# Patient Record
Sex: MALE | NOT HISPANIC OR LATINO | Employment: FULL TIME | URBAN - METROPOLITAN AREA
[De-identification: names, ages, dates, MRNs, and addresses within clinical notes are randomized per-mention and may not be internally consistent; named-entity substitution may affect disease eponyms.]

---

## 2017-10-13 ENCOUNTER — APPOINTMENT (OUTPATIENT)
Dept: CARDIOLOGY | Facility: CLINIC | Age: 52
DRG: 247 | End: 2017-10-13
Attending: STUDENT IN AN ORGANIZED HEALTH CARE EDUCATION/TRAINING PROGRAM
Payer: COMMERCIAL

## 2017-10-13 ENCOUNTER — HOSPITAL ENCOUNTER (INPATIENT)
Facility: CLINIC | Age: 52
LOS: 1 days | Discharge: HOME | DRG: 247 | End: 2017-10-14
Attending: EMERGENCY MEDICINE | Admitting: INTERNAL MEDICINE
Payer: COMMERCIAL

## 2017-10-13 ENCOUNTER — HOSPITAL ENCOUNTER (OUTPATIENT)
Age: 52
End: 2017-10-13
Attending: INTERNAL MEDICINE | Admitting: INTERNAL MEDICINE
Payer: COMMERCIAL

## 2017-10-13 ENCOUNTER — APPOINTMENT (OUTPATIENT)
Dept: CARDIOLOGY | Facility: CLINIC | Age: 52
DRG: 247 | End: 2017-10-13
Attending: INTERNAL MEDICINE
Payer: COMMERCIAL

## 2017-10-13 ENCOUNTER — APPOINTMENT (OUTPATIENT)
Dept: GENERAL RADIOLOGY | Facility: CLINIC | Age: 52
DRG: 247 | End: 2017-10-13
Attending: EMERGENCY MEDICINE
Payer: COMMERCIAL

## 2017-10-13 ENCOUNTER — APPOINTMENT (OUTPATIENT)
Dept: CT IMAGING | Facility: CLINIC | Age: 52
DRG: 247 | End: 2017-10-13
Attending: EMERGENCY MEDICINE
Payer: COMMERCIAL

## 2017-10-13 DIAGNOSIS — R06.89 DYSPNEA AND RESPIRATORY ABNORMALITY: ICD-10-CM

## 2017-10-13 DIAGNOSIS — I21.3 ST ELEVATION MYOCARDIAL INFARCTION (STEMI), UNSPECIFIED ARTERY (H): ICD-10-CM

## 2017-10-13 DIAGNOSIS — Z72.0 TOBACCO ABUSE: Primary | ICD-10-CM

## 2017-10-13 DIAGNOSIS — R06.00 DYSPNEA AND RESPIRATORY ABNORMALITY: ICD-10-CM

## 2017-10-13 DIAGNOSIS — G47.33 OSA (OBSTRUCTIVE SLEEP APNEA): ICD-10-CM

## 2017-10-13 PROBLEM — I24.9 ACS (ACUTE CORONARY SYNDROME) (H): Status: ACTIVE | Noted: 2017-10-13

## 2017-10-13 LAB
ANION GAP SERPL CALCULATED.3IONS-SCNC: 14 MMOL/L (ref 3–14)
ANION GAP SERPL CALCULATED.3IONS-SCNC: NORMAL MMOL/L (ref 6–17)
BASOPHILS # BLD AUTO: 0 10E9/L (ref 0–0.2)
BASOPHILS NFR BLD AUTO: 0.3 %
BUN SERPL-MCNC: 20 MG/DL (ref 7–30)
BUN SERPL-MCNC: NORMAL MG/DL (ref 7–30)
CALCIUM SERPL-MCNC: 8.9 MG/DL (ref 8.5–10.1)
CALCIUM SERPL-MCNC: NORMAL MG/DL (ref 8.5–10.1)
CHLORIDE SERPL-SCNC: 104 MMOL/L (ref 94–109)
CHLORIDE SERPL-SCNC: NORMAL MMOL/L (ref 94–109)
CO2 SERPL-SCNC: 21 MMOL/L (ref 20–32)
CO2 SERPL-SCNC: NORMAL MMOL/L (ref 20–32)
CREAT BLD-MCNC: 0.3 MG/DL (ref 0.66–1.25)
CREAT SERPL-MCNC: 0.6 MG/DL (ref 0.66–1.25)
CREAT SERPL-MCNC: NORMAL MG/DL (ref 0.66–1.25)
DIFFERENTIAL METHOD BLD: ABNORMAL
EOSINOPHIL # BLD AUTO: 0.2 10E9/L (ref 0–0.7)
EOSINOPHIL NFR BLD AUTO: 1.4 %
ERYTHROCYTE [DISTWIDTH] IN BLOOD BY AUTOMATED COUNT: 12.8 % (ref 10–15)
GFR SERPL CREATININE-BSD FRML MDRD: >90 ML/MIN/1.7M2
GFR SERPL CREATININE-BSD FRML MDRD: >90 ML/MIN/1.7M2
GFR SERPL CREATININE-BSD FRML MDRD: NORMAL ML/MIN/1.7M2
GLUCOSE BLDC GLUCOMTR-MCNC: 179 MG/DL (ref 70–99)
GLUCOSE BLDC GLUCOMTR-MCNC: 248 MG/DL (ref 70–99)
GLUCOSE BLDC GLUCOMTR-MCNC: 260 MG/DL (ref 70–99)
GLUCOSE SERPL-MCNC: 217 MG/DL (ref 70–99)
GLUCOSE SERPL-MCNC: NORMAL MG/DL (ref 70–99)
HCT VFR BLD AUTO: 45.4 % (ref 40–53)
HGB BLD-MCNC: 15.5 G/DL (ref 13.3–17.7)
IMM GRANULOCYTES # BLD: 0.1 10E9/L (ref 0–0.4)
IMM GRANULOCYTES NFR BLD: 0.3 %
INTERPRETATION ECG - MUSE: NORMAL
INTERPRETATION ECG - MUSE: NORMAL
KCT BLD-ACNC: 355 SEC (ref 105–167)
LYMPHOCYTES # BLD AUTO: 5.8 10E9/L (ref 0.8–5.3)
LYMPHOCYTES NFR BLD AUTO: 39.9 %
MCH RBC QN AUTO: 32.2 PG (ref 26.5–33)
MCHC RBC AUTO-ENTMCNC: 34.1 G/DL (ref 31.5–36.5)
MCV RBC AUTO: 94 FL (ref 78–100)
MONOCYTES # BLD AUTO: 1 10E9/L (ref 0–1.3)
MONOCYTES NFR BLD AUTO: 7.1 %
NEUTROPHILS # BLD AUTO: 7.4 10E9/L (ref 1.6–8.3)
NEUTROPHILS NFR BLD AUTO: 51 %
NRBC # BLD AUTO: 0 10*3/UL
NRBC BLD AUTO-RTO: 0 /100
PLATELET # BLD AUTO: 341 10E9/L (ref 150–450)
POTASSIUM SERPL-SCNC: 4.4 MMOL/L (ref 3.4–5.3)
POTASSIUM SERPL-SCNC: NORMAL MMOL/L (ref 3.4–5.3)
RBC # BLD AUTO: 4.82 10E12/L (ref 4.4–5.9)
SODIUM SERPL-SCNC: 139 MMOL/L (ref 133–144)
SODIUM SERPL-SCNC: NORMAL MMOL/L (ref 133–144)
TROPONIN I BLD-MCNC: 0.01 UG/L (ref 0–0.1)
TROPONIN I SERPL-MCNC: 0.04 UG/L (ref 0–0.04)
TROPONIN I SERPL-MCNC: 48.61 UG/L (ref 0–0.04)
TROPONIN I SERPL-MCNC: NORMAL UG/L (ref 0–0.04)
WBC # BLD AUTO: 14.5 10E9/L (ref 4–11)

## 2017-10-13 PROCEDURE — 25000128 H RX IP 250 OP 636: Performed by: INTERNAL MEDICINE

## 2017-10-13 PROCEDURE — 96374 THER/PROPH/DIAG INJ IV PUSH: CPT | Performed by: EMERGENCY MEDICINE

## 2017-10-13 PROCEDURE — 96376 TX/PRO/DX INJ SAME DRUG ADON: CPT | Performed by: EMERGENCY MEDICINE

## 2017-10-13 PROCEDURE — 25000132 ZZH RX MED GY IP 250 OP 250 PS 637: Performed by: INTERNAL MEDICINE

## 2017-10-13 PROCEDURE — 25000128 H RX IP 250 OP 636: Performed by: STUDENT IN AN ORGANIZED HEALTH CARE EDUCATION/TRAINING PROGRAM

## 2017-10-13 PROCEDURE — 93010 ELECTROCARDIOGRAM REPORT: CPT | Mod: 76 | Performed by: INTERNAL MEDICINE

## 2017-10-13 PROCEDURE — C1769 GUIDE WIRE: HCPCS

## 2017-10-13 PROCEDURE — 25000132 ZZH RX MED GY IP 250 OP 250 PS 637: Performed by: EMERGENCY MEDICINE

## 2017-10-13 PROCEDURE — 25000128 H RX IP 250 OP 636: Performed by: EMERGENCY MEDICINE

## 2017-10-13 PROCEDURE — 85025 COMPLETE CBC W/AUTO DIFF WBC: CPT | Performed by: EMERGENCY MEDICINE

## 2017-10-13 PROCEDURE — C9606 PERC D-E COR REVASC W AMI S: HCPCS

## 2017-10-13 PROCEDURE — 40000264 ECHO COMPLETE WITH OPTISON

## 2017-10-13 PROCEDURE — C1757 CATH, THROMBECTOMY/EMBOLECT: HCPCS

## 2017-10-13 PROCEDURE — 21400006 ZZH R&B CCU INTERMEDIATE UMMC

## 2017-10-13 PROCEDURE — 99291 CRITICAL CARE FIRST HOUR: CPT | Mod: 25 | Performed by: EMERGENCY MEDICINE

## 2017-10-13 PROCEDURE — 36415 COLL VENOUS BLD VENIPUNCTURE: CPT | Performed by: STUDENT IN AN ORGANIZED HEALTH CARE EDUCATION/TRAINING PROGRAM

## 2017-10-13 PROCEDURE — 74178 CT ABD&PLV WO CNTR FLWD CNTR: CPT

## 2017-10-13 PROCEDURE — C1894 INTRO/SHEATH, NON-LASER: HCPCS

## 2017-10-13 PROCEDURE — 00000146 ZZHCL STATISTIC GLUCOSE BY METER IP

## 2017-10-13 PROCEDURE — 93306 TTE W/DOPPLER COMPLETE: CPT | Mod: 26 | Performed by: INTERNAL MEDICINE

## 2017-10-13 PROCEDURE — 027035Z DILATION OF CORONARY ARTERY, ONE ARTERY WITH TWO DRUG-ELUTING INTRALUMINAL DEVICES, PERCUTANEOUS APPROACH: ICD-10-PCS | Performed by: INTERNAL MEDICINE

## 2017-10-13 PROCEDURE — 25000132 ZZH RX MED GY IP 250 OP 250 PS 637: Performed by: STUDENT IN AN ORGANIZED HEALTH CARE EDUCATION/TRAINING PROGRAM

## 2017-10-13 PROCEDURE — 71010 XR CHEST PORT 1 VW: CPT

## 2017-10-13 PROCEDURE — 99153 MOD SED SAME PHYS/QHP EA: CPT

## 2017-10-13 PROCEDURE — 84484 ASSAY OF TROPONIN QUANT: CPT | Performed by: EMERGENCY MEDICINE

## 2017-10-13 PROCEDURE — 93005 ELECTROCARDIOGRAM TRACING: CPT

## 2017-10-13 PROCEDURE — C1887 CATHETER, GUIDING: HCPCS

## 2017-10-13 PROCEDURE — 84484 ASSAY OF TROPONIN QUANT: CPT | Performed by: STUDENT IN AN ORGANIZED HEALTH CARE EDUCATION/TRAINING PROGRAM

## 2017-10-13 PROCEDURE — B2111ZZ FLUOROSCOPY OF MULTIPLE CORONARY ARTERIES USING LOW OSMOLAR CONTRAST: ICD-10-PCS | Performed by: INTERNAL MEDICINE

## 2017-10-13 PROCEDURE — 27210787 ZZH MANIFOLD CR2

## 2017-10-13 PROCEDURE — 92941 PRQ TRLML REVSC TOT OCCL AMI: CPT | Mod: LD | Performed by: INTERNAL MEDICINE

## 2017-10-13 PROCEDURE — 25000125 ZZHC RX 250: Performed by: EMERGENCY MEDICINE

## 2017-10-13 PROCEDURE — 80048 BASIC METABOLIC PNL TOTAL CA: CPT | Performed by: EMERGENCY MEDICINE

## 2017-10-13 PROCEDURE — 93454 CORONARY ARTERY ANGIO S&I: CPT | Mod: 26 | Performed by: INTERNAL MEDICINE

## 2017-10-13 PROCEDURE — 71270 CT THORAX DX C-/C+: CPT

## 2017-10-13 PROCEDURE — 85347 COAGULATION TIME ACTIVATED: CPT

## 2017-10-13 PROCEDURE — C1760 CLOSURE DEV, VASC: HCPCS

## 2017-10-13 PROCEDURE — C1874 STENT, COATED/COV W/DEL SYS: HCPCS

## 2017-10-13 PROCEDURE — 93454 CORONARY ARTERY ANGIO S&I: CPT

## 2017-10-13 PROCEDURE — 84484 ASSAY OF TROPONIN QUANT: CPT

## 2017-10-13 PROCEDURE — 27211089 ZZH KIT ACIST INJECTOR CR3

## 2017-10-13 PROCEDURE — 93010 ELECTROCARDIOGRAM REPORT: CPT | Mod: Z6 | Performed by: EMERGENCY MEDICINE

## 2017-10-13 PROCEDURE — 93005 ELECTROCARDIOGRAM TRACING: CPT | Performed by: EMERGENCY MEDICINE

## 2017-10-13 PROCEDURE — 02C03ZZ EXTIRPATION OF MATTER FROM CORONARY ARTERY, ONE ARTERY, PERCUTANEOUS APPROACH: ICD-10-PCS | Performed by: INTERNAL MEDICINE

## 2017-10-13 PROCEDURE — 25000131 ZZH RX MED GY IP 250 OP 636 PS 637: Performed by: STUDENT IN AN ORGANIZED HEALTH CARE EDUCATION/TRAINING PROGRAM

## 2017-10-13 PROCEDURE — 96375 TX/PRO/DX INJ NEW DRUG ADDON: CPT | Performed by: EMERGENCY MEDICINE

## 2017-10-13 PROCEDURE — C1725 CATH, TRANSLUMIN NON-LASER: HCPCS

## 2017-10-13 PROCEDURE — 99222 1ST HOSP IP/OBS MODERATE 55: CPT | Mod: 25 | Performed by: INTERNAL MEDICINE

## 2017-10-13 PROCEDURE — 27210946 ZZH KIT HC TOTES DISP CR8

## 2017-10-13 PROCEDURE — 25000125 ZZHC RX 250: Performed by: INTERNAL MEDICINE

## 2017-10-13 PROCEDURE — 99152 MOD SED SAME PHYS/QHP 5/>YRS: CPT

## 2017-10-13 PROCEDURE — 25500064 ZZH RX 255 OP 636: Performed by: INTERNAL MEDICINE

## 2017-10-13 PROCEDURE — 82565 ASSAY OF CREATININE: CPT

## 2017-10-13 RX ORDER — NICARDIPINE HYDROCHLORIDE 2.5 MG/ML
100 INJECTION INTRAVENOUS
Status: DISCONTINUED | OUTPATIENT
Start: 2017-10-13 | End: 2018-12-09 | Stop reason: HOSPADM

## 2017-10-13 RX ORDER — ASPIRIN 81 MG/1
81-324 TABLET, CHEWABLE ORAL
Status: DISCONTINUED | OUTPATIENT
Start: 2017-10-13 | End: 2017-10-13

## 2017-10-13 RX ORDER — METHYLPREDNISOLONE SODIUM SUCCINATE 125 MG/2ML
125 INJECTION, POWDER, LYOPHILIZED, FOR SOLUTION INTRAMUSCULAR; INTRAVENOUS
Status: DISCONTINUED | OUTPATIENT
Start: 2017-10-13 | End: 2017-10-13 | Stop reason: HOSPADM

## 2017-10-13 RX ORDER — LISINOPRIL 10 MG/1
10 TABLET ORAL DAILY
Status: DISCONTINUED | OUTPATIENT
Start: 2017-10-13 | End: 2017-10-13

## 2017-10-13 RX ORDER — CLOPIDOGREL BISULFATE 75 MG/1
300-600 TABLET ORAL
Status: DISCONTINUED | OUTPATIENT
Start: 2017-10-13 | End: 2017-10-13

## 2017-10-13 RX ORDER — MORPHINE SULFATE 4 MG/ML
4 INJECTION, SOLUTION INTRAMUSCULAR; INTRAVENOUS ONCE
Status: COMPLETED | OUTPATIENT
Start: 2017-10-13 | End: 2017-10-13

## 2017-10-13 RX ORDER — PROTAMINE SULFATE 10 MG/ML
1-5 INJECTION, SOLUTION INTRAVENOUS
Status: DISCONTINUED | OUTPATIENT
Start: 2017-10-13 | End: 2017-10-13 | Stop reason: HOSPADM

## 2017-10-13 RX ORDER — CARVEDILOL 6.25 MG/1
6.25 TABLET ORAL ONCE
Status: COMPLETED | OUTPATIENT
Start: 2017-10-13 | End: 2017-10-13

## 2017-10-13 RX ORDER — CLOPIDOGREL BISULFATE 75 MG/1
75 TABLET ORAL
Status: DISCONTINUED | OUTPATIENT
Start: 2017-10-13 | End: 2017-10-13 | Stop reason: HOSPADM

## 2017-10-13 RX ORDER — ENALAPRILAT 1.25 MG/ML
1.25-2.5 INJECTION INTRAVENOUS
Status: DISCONTINUED | OUTPATIENT
Start: 2017-10-13 | End: 2017-10-13 | Stop reason: HOSPADM

## 2017-10-13 RX ORDER — NALOXONE HYDROCHLORIDE 0.4 MG/ML
.2-.4 INJECTION, SOLUTION INTRAMUSCULAR; INTRAVENOUS; SUBCUTANEOUS
Status: ACTIVE | OUTPATIENT
Start: 2017-10-13 | End: 2017-10-13

## 2017-10-13 RX ORDER — ARGATROBAN 1 MG/ML
350 INJECTION, SOLUTION INTRAVENOUS
Status: DISCONTINUED | OUTPATIENT
Start: 2017-10-13 | End: 2017-10-13 | Stop reason: HOSPADM

## 2017-10-13 RX ORDER — EPTIFIBATIDE 2 MG/ML
2 INJECTION, SOLUTION INTRAVENOUS CONTINUOUS PRN
Status: DISCONTINUED | OUTPATIENT
Start: 2017-10-13 | End: 2017-10-13

## 2017-10-13 RX ORDER — PHENYLEPHRINE HCL IN 0.9% NACL 1 MG/10 ML
20-100 SYRINGE (ML) INTRAVENOUS
Status: DISCONTINUED | OUTPATIENT
Start: 2017-10-13 | End: 2017-10-13 | Stop reason: HOSPADM

## 2017-10-13 RX ORDER — VERAPAMIL HYDROCHLORIDE 2.5 MG/ML
1-5 INJECTION, SOLUTION INTRAVENOUS
Status: DISCONTINUED | OUTPATIENT
Start: 2017-10-13 | End: 2017-10-13 | Stop reason: HOSPADM

## 2017-10-13 RX ORDER — ONDANSETRON 4 MG/1
4 TABLET, ORALLY DISINTEGRATING ORAL EVERY 6 HOURS PRN
Status: DISCONTINUED | OUTPATIENT
Start: 2017-10-13 | End: 2017-10-14 | Stop reason: HOSPADM

## 2017-10-13 RX ORDER — PROTAMINE SULFATE 10 MG/ML
25-100 INJECTION, SOLUTION INTRAVENOUS EVERY 5 MIN PRN
Status: DISCONTINUED | OUTPATIENT
Start: 2017-10-13 | End: 2017-10-13

## 2017-10-13 RX ORDER — ATORVASTATIN CALCIUM 80 MG/1
80 TABLET, FILM COATED ORAL DAILY
Status: DISCONTINUED | OUTPATIENT
Start: 2017-10-13 | End: 2017-10-14 | Stop reason: HOSPADM

## 2017-10-13 RX ORDER — ATROPINE SULFATE 0.1 MG/ML
.5-1 INJECTION INTRAVENOUS
Status: DISCONTINUED | OUTPATIENT
Start: 2017-10-13 | End: 2017-10-13

## 2017-10-13 RX ORDER — IOPAMIDOL 755 MG/ML
185 INJECTION, SOLUTION INTRAVASCULAR ONCE
Status: COMPLETED | OUTPATIENT
Start: 2017-10-13 | End: 2017-10-13

## 2017-10-13 RX ORDER — ONDANSETRON 2 MG/ML
4 INJECTION INTRAMUSCULAR; INTRAVENOUS EVERY 4 HOURS PRN
Status: DISCONTINUED | OUTPATIENT
Start: 2017-10-13 | End: 2017-10-13

## 2017-10-13 RX ORDER — DEXTROSE MONOHYDRATE 25 G/50ML
25-50 INJECTION, SOLUTION INTRAVENOUS
Status: DISCONTINUED | OUTPATIENT
Start: 2017-10-13 | End: 2017-10-14 | Stop reason: HOSPADM

## 2017-10-13 RX ORDER — EPINEPHRINE 1 MG/ML
0.3 INJECTION, SOLUTION, CONCENTRATE INTRAVENOUS
Status: DISCONTINUED | OUTPATIENT
Start: 2017-10-13 | End: 2017-10-13 | Stop reason: HOSPADM

## 2017-10-13 RX ORDER — ATORVASTATIN CALCIUM 80 MG/1
80 TABLET, FILM COATED ORAL DAILY
Status: DISCONTINUED | OUTPATIENT
Start: 2017-10-13 | End: 2017-10-13

## 2017-10-13 RX ORDER — ENALAPRILAT 1.25 MG/ML
1.25-2.5 INJECTION INTRAVENOUS
Status: DISCONTINUED | OUTPATIENT
Start: 2017-10-13 | End: 2017-10-13

## 2017-10-13 RX ORDER — PROTAMINE SULFATE 10 MG/ML
25-100 INJECTION, SOLUTION INTRAVENOUS EVERY 5 MIN PRN
Status: DISCONTINUED | OUTPATIENT
Start: 2017-10-13 | End: 2017-10-13 | Stop reason: HOSPADM

## 2017-10-13 RX ORDER — FENTANYL CITRATE 50 UG/ML
25-50 INJECTION, SOLUTION INTRAMUSCULAR; INTRAVENOUS
Status: DISCONTINUED | OUTPATIENT
Start: 2017-10-13 | End: 2017-10-13 | Stop reason: HOSPADM

## 2017-10-13 RX ORDER — NITROGLYCERIN 0.4 MG/1
0.4 TABLET SUBLINGUAL EVERY 5 MIN PRN
Status: DISCONTINUED | OUTPATIENT
Start: 2017-10-13 | End: 2017-10-14 | Stop reason: HOSPADM

## 2017-10-13 RX ORDER — DEXTROSE MONOHYDRATE 25 G/50ML
12.5-5 INJECTION, SOLUTION INTRAVENOUS EVERY 30 MIN PRN
Status: DISCONTINUED | OUTPATIENT
Start: 2017-10-13 | End: 2017-10-13

## 2017-10-13 RX ORDER — OXYCODONE HYDROCHLORIDE 5 MG/1
5 TABLET ORAL EVERY 6 HOURS PRN
Status: DISCONTINUED | OUTPATIENT
Start: 2017-10-13 | End: 2017-10-13

## 2017-10-13 RX ORDER — FENTANYL CITRATE 50 UG/ML
25-50 INJECTION, SOLUTION INTRAMUSCULAR; INTRAVENOUS
Status: ACTIVE | OUTPATIENT
Start: 2017-10-13 | End: 2017-10-13

## 2017-10-13 RX ORDER — POTASSIUM CHLORIDE 29.8 MG/ML
20 INJECTION INTRAVENOUS
Status: DISCONTINUED | OUTPATIENT
Start: 2017-10-13 | End: 2017-10-13 | Stop reason: HOSPADM

## 2017-10-13 RX ORDER — PROMETHAZINE HYDROCHLORIDE 25 MG/ML
6.25-25 INJECTION, SOLUTION INTRAMUSCULAR; INTRAVENOUS EVERY 4 HOURS PRN
Status: DISCONTINUED | OUTPATIENT
Start: 2017-10-13 | End: 2017-10-13 | Stop reason: HOSPADM

## 2017-10-13 RX ORDER — DOBUTAMINE HYDROCHLORIDE 200 MG/100ML
2-20 INJECTION INTRAVENOUS CONTINUOUS PRN
Status: DISCONTINUED | OUTPATIENT
Start: 2017-10-13 | End: 2017-10-13

## 2017-10-13 RX ORDER — DIPHENHYDRAMINE HYDROCHLORIDE 50 MG/ML
25-50 INJECTION INTRAMUSCULAR; INTRAVENOUS
Status: DISCONTINUED | OUTPATIENT
Start: 2017-10-13 | End: 2017-10-13 | Stop reason: HOSPADM

## 2017-10-13 RX ORDER — CLOPIDOGREL BISULFATE 75 MG/1
75 TABLET ORAL
Status: DISCONTINUED | OUTPATIENT
Start: 2017-10-13 | End: 2017-10-13

## 2017-10-13 RX ORDER — LISINOPRIL 5 MG/1
5 TABLET ORAL ONCE
Status: COMPLETED | OUTPATIENT
Start: 2017-10-13 | End: 2017-10-13

## 2017-10-13 RX ORDER — ASPIRIN 325 MG
325 TABLET ORAL
Status: DISCONTINUED | OUTPATIENT
Start: 2017-10-13 | End: 2017-10-13

## 2017-10-13 RX ORDER — NICARDIPINE HYDROCHLORIDE 2.5 MG/ML
100 INJECTION INTRAVENOUS
Status: DISCONTINUED | OUTPATIENT
Start: 2017-10-13 | End: 2017-10-13

## 2017-10-13 RX ORDER — MORPHINE SULFATE 4 MG/ML
4 INJECTION, SOLUTION INTRAMUSCULAR; INTRAVENOUS ONCE
Status: DISCONTINUED | OUTPATIENT
Start: 2017-10-13 | End: 2017-10-14 | Stop reason: HOSPADM

## 2017-10-13 RX ORDER — ARGATROBAN 1 MG/ML
150 INJECTION, SOLUTION INTRAVENOUS
Status: DISCONTINUED | OUTPATIENT
Start: 2017-10-13 | End: 2017-10-13

## 2017-10-13 RX ORDER — LORAZEPAM 1 MG/1
2 TABLET ORAL
Status: DISCONTINUED | OUTPATIENT
Start: 2017-10-13 | End: 2017-10-13

## 2017-10-13 RX ORDER — MAGNESIUM HYDROXIDE 1200 MG/15ML
1000 LIQUID ORAL CONTINUOUS PRN
Status: DISCONTINUED | OUTPATIENT
Start: 2017-10-13 | End: 2017-10-13

## 2017-10-13 RX ORDER — METOPROLOL TARTRATE 25 MG/1
25 TABLET, FILM COATED ORAL 2 TIMES DAILY
Status: DISCONTINUED | OUTPATIENT
Start: 2017-10-13 | End: 2017-10-13

## 2017-10-13 RX ORDER — ATROPINE SULFATE 0.1 MG/ML
.5-1 INJECTION INTRAVENOUS
Status: DISCONTINUED | OUTPATIENT
Start: 2017-10-13 | End: 2017-10-13 | Stop reason: HOSPADM

## 2017-10-13 RX ORDER — LIDOCAINE HYDROCHLORIDE 10 MG/ML
30 INJECTION, SOLUTION EPIDURAL; INFILTRATION; INTRACAUDAL; PERINEURAL
Status: DISCONTINUED | OUTPATIENT
Start: 2017-10-13 | End: 2018-12-09 | Stop reason: HOSPADM

## 2017-10-13 RX ORDER — LIDOCAINE HYDROCHLORIDE 10 MG/ML
30 INJECTION, SOLUTION EPIDURAL; INFILTRATION; INTRACAUDAL; PERINEURAL
Status: DISCONTINUED | OUTPATIENT
Start: 2017-10-13 | End: 2017-10-13 | Stop reason: HOSPADM

## 2017-10-13 RX ORDER — NIFEDIPINE 10 MG/1
10 CAPSULE ORAL
Status: DISCONTINUED | OUTPATIENT
Start: 2017-10-13 | End: 2018-12-09 | Stop reason: HOSPADM

## 2017-10-13 RX ORDER — FLUMAZENIL 0.1 MG/ML
0.2 INJECTION, SOLUTION INTRAVENOUS
Status: ACTIVE | OUTPATIENT
Start: 2017-10-13 | End: 2017-10-13

## 2017-10-13 RX ORDER — NITROGLYCERIN 5 MG/ML
100-200 VIAL (ML) INTRAVENOUS
Status: DISCONTINUED | OUTPATIENT
Start: 2017-10-13 | End: 2018-12-09 | Stop reason: HOSPADM

## 2017-10-13 RX ORDER — FUROSEMIDE 10 MG/ML
20-100 INJECTION INTRAMUSCULAR; INTRAVENOUS
Status: DISCONTINUED | OUTPATIENT
Start: 2017-10-13 | End: 2017-10-13 | Stop reason: HOSPADM

## 2017-10-13 RX ORDER — NITROGLYCERIN 5 MG/ML
100-500 VIAL (ML) INTRAVENOUS
Status: DISCONTINUED | OUTPATIENT
Start: 2017-10-13 | End: 2018-12-09 | Stop reason: HOSPADM

## 2017-10-13 RX ORDER — ASPIRIN 325 MG
325 TABLET ORAL
Status: DISCONTINUED | OUTPATIENT
Start: 2017-10-13 | End: 2017-10-13 | Stop reason: HOSPADM

## 2017-10-13 RX ORDER — ARGATROBAN 1 MG/ML
150 INJECTION, SOLUTION INTRAVENOUS
Status: DISCONTINUED | OUTPATIENT
Start: 2017-10-13 | End: 2017-10-13 | Stop reason: HOSPADM

## 2017-10-13 RX ORDER — NITROGLYCERIN 0.4 MG/1
0.4 TABLET SUBLINGUAL EVERY 5 MIN PRN
Status: DISCONTINUED | OUTPATIENT
Start: 2017-10-13 | End: 2017-10-13 | Stop reason: HOSPADM

## 2017-10-13 RX ORDER — PRASUGREL 10 MG/1
10-60 TABLET, FILM COATED ORAL
Status: DISCONTINUED | OUTPATIENT
Start: 2017-10-13 | End: 2017-10-13

## 2017-10-13 RX ORDER — ACETAMINOPHEN 325 MG/1
650 TABLET ORAL EVERY 4 HOURS PRN
Status: DISCONTINUED | OUTPATIENT
Start: 2017-10-13 | End: 2017-10-14 | Stop reason: HOSPADM

## 2017-10-13 RX ORDER — LORAZEPAM 2 MG/ML
0.5 INJECTION INTRAMUSCULAR ONCE
Status: COMPLETED | OUTPATIENT
Start: 2017-10-13 | End: 2017-10-13

## 2017-10-13 RX ORDER — DOPAMINE HYDROCHLORIDE 160 MG/100ML
2-20 INJECTION, SOLUTION INTRAVENOUS CONTINUOUS PRN
Status: DISCONTINUED | OUTPATIENT
Start: 2017-10-13 | End: 2017-10-13 | Stop reason: HOSPADM

## 2017-10-13 RX ORDER — LIDOCAINE 40 MG/G
CREAM TOPICAL
Status: DISCONTINUED | OUTPATIENT
Start: 2017-10-13 | End: 2017-10-14 | Stop reason: HOSPADM

## 2017-10-13 RX ORDER — ADENOSINE 3 MG/ML
12-12000 INJECTION, SOLUTION INTRAVENOUS
Status: DISCONTINUED | OUTPATIENT
Start: 2017-10-13 | End: 2017-10-13 | Stop reason: HOSPADM

## 2017-10-13 RX ORDER — POTASSIUM CHLORIDE 7.45 MG/ML
10 INJECTION INTRAVENOUS
Status: DISCONTINUED | OUTPATIENT
Start: 2017-10-13 | End: 2017-10-13

## 2017-10-13 RX ORDER — HYDRALAZINE HYDROCHLORIDE 20 MG/ML
10-20 INJECTION INTRAMUSCULAR; INTRAVENOUS
Status: DISCONTINUED | OUTPATIENT
Start: 2017-10-13 | End: 2017-10-13 | Stop reason: HOSPADM

## 2017-10-13 RX ORDER — NITROGLYCERIN 20 MG/100ML
.07-2 INJECTION INTRAVENOUS CONTINUOUS PRN
Status: DISCONTINUED | OUTPATIENT
Start: 2017-10-13 | End: 2017-10-13

## 2017-10-13 RX ORDER — ASPIRIN 81 MG/1
81 TABLET ORAL DAILY
Status: DISCONTINUED | OUTPATIENT
Start: 2017-10-14 | End: 2017-10-14 | Stop reason: HOSPADM

## 2017-10-13 RX ORDER — ARGATROBAN 1 MG/ML
350 INJECTION, SOLUTION INTRAVENOUS
Status: DISCONTINUED | OUTPATIENT
Start: 2017-10-13 | End: 2017-10-13

## 2017-10-13 RX ORDER — DOBUTAMINE HYDROCHLORIDE 200 MG/100ML
2-20 INJECTION INTRAVENOUS CONTINUOUS PRN
Status: DISCONTINUED | OUTPATIENT
Start: 2017-10-13 | End: 2017-10-13 | Stop reason: HOSPADM

## 2017-10-13 RX ORDER — NIFEDIPINE 10 MG/1
10 CAPSULE ORAL
Status: DISCONTINUED | OUTPATIENT
Start: 2017-10-13 | End: 2017-10-13 | Stop reason: HOSPADM

## 2017-10-13 RX ORDER — IOPAMIDOL 755 MG/ML
100 INJECTION, SOLUTION INTRAVASCULAR ONCE
Status: COMPLETED | OUTPATIENT
Start: 2017-10-13 | End: 2017-10-13

## 2017-10-13 RX ORDER — FLUMAZENIL 0.1 MG/ML
0.2 INJECTION, SOLUTION INTRAVENOUS
Status: DISCONTINUED | OUTPATIENT
Start: 2017-10-13 | End: 2017-10-13 | Stop reason: HOSPADM

## 2017-10-13 RX ORDER — SODIUM NITROPRUSSIDE 25 MG/ML
100-200 INJECTION INTRAVENOUS
Status: DISCONTINUED | OUTPATIENT
Start: 2017-10-13 | End: 2017-10-13

## 2017-10-13 RX ORDER — ACETAMINOPHEN 650 MG/1
650 SUPPOSITORY RECTAL EVERY 4 HOURS PRN
Status: DISCONTINUED | OUTPATIENT
Start: 2017-10-13 | End: 2017-10-14 | Stop reason: HOSPADM

## 2017-10-13 RX ORDER — NITROGLYCERIN 20 MG/100ML
.07-2 INJECTION INTRAVENOUS CONTINUOUS PRN
Status: DISCONTINUED | OUTPATIENT
Start: 2017-10-13 | End: 2017-10-13 | Stop reason: HOSPADM

## 2017-10-13 RX ORDER — DOPAMINE HYDROCHLORIDE 160 MG/100ML
2-20 INJECTION, SOLUTION INTRAVENOUS CONTINUOUS PRN
Status: DISCONTINUED | OUTPATIENT
Start: 2017-10-13 | End: 2017-10-13

## 2017-10-13 RX ORDER — ONDANSETRON 2 MG/ML
4 INJECTION INTRAMUSCULAR; INTRAVENOUS EVERY 6 HOURS PRN
Status: DISCONTINUED | OUTPATIENT
Start: 2017-10-13 | End: 2017-10-14 | Stop reason: HOSPADM

## 2017-10-13 RX ORDER — EPINEPHRINE 1 MG/ML
0.3 INJECTION, SOLUTION, CONCENTRATE INTRAVENOUS
Status: DISCONTINUED | OUTPATIENT
Start: 2017-10-13 | End: 2017-10-13

## 2017-10-13 RX ORDER — METOPROLOL TARTRATE 1 MG/ML
5 INJECTION, SOLUTION INTRAVENOUS EVERY 5 MIN PRN
Status: DISCONTINUED | OUTPATIENT
Start: 2017-10-13 | End: 2017-10-13 | Stop reason: HOSPADM

## 2017-10-13 RX ORDER — ATROPINE SULFATE 0.1 MG/ML
0.5 INJECTION INTRAVENOUS EVERY 5 MIN PRN
Status: ACTIVE | OUTPATIENT
Start: 2017-10-13 | End: 2017-10-13

## 2017-10-13 RX ORDER — METOPROLOL TARTRATE 1 MG/ML
5 INJECTION, SOLUTION INTRAVENOUS EVERY 5 MIN PRN
Status: DISCONTINUED | OUTPATIENT
Start: 2017-10-13 | End: 2018-12-09 | Stop reason: HOSPADM

## 2017-10-13 RX ORDER — DEXTROSE MONOHYDRATE 25 G/50ML
12.5-5 INJECTION, SOLUTION INTRAVENOUS EVERY 30 MIN PRN
Status: DISCONTINUED | OUTPATIENT
Start: 2017-10-13 | End: 2017-10-13 | Stop reason: HOSPADM

## 2017-10-13 RX ORDER — HEPARIN SODIUM 1000 [USP'U]/ML
1000-10000 INJECTION, SOLUTION INTRAVENOUS; SUBCUTANEOUS EVERY 5 MIN PRN
Status: DISCONTINUED | OUTPATIENT
Start: 2017-10-13 | End: 2018-12-09 | Stop reason: HOSPADM

## 2017-10-13 RX ORDER — LORAZEPAM 2 MG/ML
.5-2 INJECTION INTRAMUSCULAR EVERY 4 HOURS PRN
Status: DISCONTINUED | OUTPATIENT
Start: 2017-10-13 | End: 2017-10-13 | Stop reason: HOSPADM

## 2017-10-13 RX ORDER — NALOXONE HYDROCHLORIDE 0.4 MG/ML
0.4 INJECTION, SOLUTION INTRAMUSCULAR; INTRAVENOUS; SUBCUTANEOUS EVERY 5 MIN PRN
Status: DISCONTINUED | OUTPATIENT
Start: 2017-10-13 | End: 2017-10-13 | Stop reason: HOSPADM

## 2017-10-13 RX ORDER — NALOXONE HYDROCHLORIDE 0.4 MG/ML
.1-.4 INJECTION, SOLUTION INTRAMUSCULAR; INTRAVENOUS; SUBCUTANEOUS
Status: DISCONTINUED | OUTPATIENT
Start: 2017-10-13 | End: 2017-10-14 | Stop reason: HOSPADM

## 2017-10-13 RX ORDER — ASPIRIN 81 MG/1
81 TABLET ORAL DAILY
Status: DISCONTINUED | OUTPATIENT
Start: 2017-10-13 | End: 2017-10-13

## 2017-10-13 RX ORDER — ONDANSETRON 2 MG/ML
4 INJECTION INTRAMUSCULAR; INTRAVENOUS EVERY 4 HOURS PRN
Status: DISCONTINUED | OUTPATIENT
Start: 2017-10-13 | End: 2017-10-13 | Stop reason: HOSPADM

## 2017-10-13 RX ORDER — EPTIFIBATIDE 2 MG/ML
180 INJECTION, SOLUTION INTRAVENOUS EVERY 10 MIN PRN
Status: DISCONTINUED | OUTPATIENT
Start: 2017-10-13 | End: 2017-10-13 | Stop reason: HOSPADM

## 2017-10-13 RX ORDER — NICARDIPINE HYDROCHLORIDE 2.5 MG/ML
100 INJECTION INTRAVENOUS
Status: DISCONTINUED | OUTPATIENT
Start: 2017-10-13 | End: 2017-10-13 | Stop reason: HOSPADM

## 2017-10-13 RX ORDER — EPTIFIBATIDE 2 MG/ML
1 INJECTION, SOLUTION INTRAVENOUS CONTINUOUS PRN
Status: DISCONTINUED | OUTPATIENT
Start: 2017-10-13 | End: 2017-10-13 | Stop reason: HOSPADM

## 2017-10-13 RX ORDER — SIMVASTATIN 40 MG
40 TABLET ORAL DAILY
Status: DISCONTINUED | OUTPATIENT
Start: 2017-10-13 | End: 2017-10-13

## 2017-10-13 RX ORDER — PROTAMINE SULFATE 10 MG/ML
1-5 INJECTION, SOLUTION INTRAVENOUS
Status: DISCONTINUED | OUTPATIENT
Start: 2017-10-13 | End: 2017-10-13

## 2017-10-13 RX ORDER — NALOXONE HYDROCHLORIDE 0.4 MG/ML
.1-.4 INJECTION, SOLUTION INTRAMUSCULAR; INTRAVENOUS; SUBCUTANEOUS
Status: DISCONTINUED | OUTPATIENT
Start: 2017-10-13 | End: 2017-10-13

## 2017-10-13 RX ORDER — NITROGLYCERIN 0.4 MG/1
0.4 TABLET SUBLINGUAL EVERY 5 MIN PRN
Status: DISCONTINUED | OUTPATIENT
Start: 2017-10-13 | End: 2018-12-09 | Stop reason: HOSPADM

## 2017-10-13 RX ORDER — LORAZEPAM 2 MG/ML
.5-2 INJECTION INTRAMUSCULAR EVERY 4 HOURS PRN
Status: DISCONTINUED | OUTPATIENT
Start: 2017-10-13 | End: 2017-10-13

## 2017-10-13 RX ORDER — SODIUM CHLORIDE 9 MG/ML
INJECTION, SOLUTION INTRAVENOUS CONTINUOUS
Status: ACTIVE | OUTPATIENT
Start: 2017-10-13 | End: 2017-10-13

## 2017-10-13 RX ORDER — FLUMAZENIL 0.1 MG/ML
0.2 INJECTION, SOLUTION INTRAVENOUS
Status: DISCONTINUED | OUTPATIENT
Start: 2017-10-13 | End: 2018-12-09 | Stop reason: HOSPADM

## 2017-10-13 RX ORDER — PHENYLEPHRINE HCL IN 0.9% NACL 1 MG/10 ML
20-100 SYRINGE (ML) INTRAVENOUS
Status: DISCONTINUED | OUTPATIENT
Start: 2017-10-13 | End: 2017-10-13

## 2017-10-13 RX ORDER — NITROGLYCERIN 5 MG/ML
100-200 VIAL (ML) INTRAVENOUS
Status: DISCONTINUED | OUTPATIENT
Start: 2017-10-13 | End: 2017-10-13 | Stop reason: HOSPADM

## 2017-10-13 RX ORDER — ASPIRIN 81 MG/1
81-324 TABLET, CHEWABLE ORAL
Status: DISCONTINUED | OUTPATIENT
Start: 2017-10-13 | End: 2017-10-13 | Stop reason: HOSPADM

## 2017-10-13 RX ORDER — HEPARIN SODIUM 1000 [USP'U]/ML
1000-10000 INJECTION, SOLUTION INTRAVENOUS; SUBCUTANEOUS EVERY 5 MIN PRN
Status: DISCONTINUED | OUTPATIENT
Start: 2017-10-13 | End: 2017-10-13 | Stop reason: HOSPADM

## 2017-10-13 RX ORDER — PRASUGREL 10 MG/1
10-60 TABLET, FILM COATED ORAL
Status: DISCONTINUED | OUTPATIENT
Start: 2017-10-13 | End: 2017-10-13 | Stop reason: HOSPADM

## 2017-10-13 RX ORDER — DIPHENHYDRAMINE HYDROCHLORIDE 50 MG/ML
25-50 INJECTION INTRAMUSCULAR; INTRAVENOUS
Status: DISCONTINUED | OUTPATIENT
Start: 2017-10-13 | End: 2017-10-13

## 2017-10-13 RX ORDER — ASPIRIN 81 MG/1
324 TABLET, CHEWABLE ORAL ONCE
Status: COMPLETED | OUTPATIENT
Start: 2017-10-13 | End: 2017-10-13

## 2017-10-13 RX ORDER — NALOXONE HYDROCHLORIDE 0.4 MG/ML
0.4 INJECTION, SOLUTION INTRAMUSCULAR; INTRAVENOUS; SUBCUTANEOUS EVERY 5 MIN PRN
Status: DISCONTINUED | OUTPATIENT
Start: 2017-10-13 | End: 2018-12-09 | Stop reason: HOSPADM

## 2017-10-13 RX ORDER — EPTIFIBATIDE 2 MG/ML
180 INJECTION, SOLUTION INTRAVENOUS EVERY 10 MIN PRN
Status: DISCONTINUED | OUTPATIENT
Start: 2017-10-13 | End: 2017-10-13

## 2017-10-13 RX ORDER — OXYCODONE HYDROCHLORIDE 10 MG/1
10 TABLET ORAL EVERY 6 HOURS PRN
Status: DISCONTINUED | OUTPATIENT
Start: 2017-10-13 | End: 2017-10-14 | Stop reason: HOSPADM

## 2017-10-13 RX ORDER — VERAPAMIL HYDROCHLORIDE 2.5 MG/ML
1-5 INJECTION, SOLUTION INTRAVENOUS
Status: DISCONTINUED | OUTPATIENT
Start: 2017-10-13 | End: 2017-10-13

## 2017-10-13 RX ORDER — CLOPIDOGREL BISULFATE 75 MG/1
300-600 TABLET ORAL
Status: DISCONTINUED | OUTPATIENT
Start: 2017-10-13 | End: 2017-10-13 | Stop reason: HOSPADM

## 2017-10-13 RX ORDER — SODIUM NITROPRUSSIDE 25 MG/ML
100-200 INJECTION INTRAVENOUS
Status: DISCONTINUED | OUTPATIENT
Start: 2017-10-13 | End: 2017-10-13 | Stop reason: HOSPADM

## 2017-10-13 RX ORDER — OXYCODONE HYDROCHLORIDE 5 MG/1
5 TABLET ORAL ONCE
Status: DISCONTINUED | OUTPATIENT
Start: 2017-10-13 | End: 2017-10-14 | Stop reason: HOSPADM

## 2017-10-13 RX ORDER — MAGNESIUM HYDROXIDE 1200 MG/15ML
1000 LIQUID ORAL CONTINUOUS PRN
Status: DISCONTINUED | OUTPATIENT
Start: 2017-10-13 | End: 2017-10-13 | Stop reason: HOSPADM

## 2017-10-13 RX ORDER — FUROSEMIDE 10 MG/ML
20-100 INJECTION INTRAMUSCULAR; INTRAVENOUS
Status: DISCONTINUED | OUTPATIENT
Start: 2017-10-13 | End: 2018-12-09 | Stop reason: HOSPADM

## 2017-10-13 RX ORDER — FENTANYL CITRATE 50 UG/ML
25-50 INJECTION, SOLUTION INTRAMUSCULAR; INTRAVENOUS
Status: DISCONTINUED | OUTPATIENT
Start: 2017-10-13 | End: 2017-10-13

## 2017-10-13 RX ORDER — NITROGLYCERIN 5 MG/ML
100-500 VIAL (ML) INTRAVENOUS
Status: DISCONTINUED | OUTPATIENT
Start: 2017-10-13 | End: 2017-10-13 | Stop reason: HOSPADM

## 2017-10-13 RX ORDER — METHYLPREDNISOLONE SODIUM SUCCINATE 125 MG/2ML
125 INJECTION, POWDER, LYOPHILIZED, FOR SOLUTION INTRAMUSCULAR; INTRAVENOUS
Status: DISCONTINUED | OUTPATIENT
Start: 2017-10-13 | End: 2017-10-13

## 2017-10-13 RX ORDER — CARVEDILOL 6.25 MG/1
6.25 TABLET ORAL 2 TIMES DAILY
Status: DISCONTINUED | OUTPATIENT
Start: 2017-10-13 | End: 2017-10-13

## 2017-10-13 RX ORDER — NICOTINE 21 MG/24HR
1 PATCH, TRANSDERMAL 24 HOURS TRANSDERMAL DAILY
Status: DISCONTINUED | OUTPATIENT
Start: 2017-10-13 | End: 2017-10-14 | Stop reason: HOSPADM

## 2017-10-13 RX ORDER — HYDRALAZINE HYDROCHLORIDE 20 MG/ML
10-20 INJECTION INTRAMUSCULAR; INTRAVENOUS
Status: DISCONTINUED | OUTPATIENT
Start: 2017-10-13 | End: 2018-12-09 | Stop reason: HOSPADM

## 2017-10-13 RX ORDER — POTASSIUM CHLORIDE 7.45 MG/ML
10 INJECTION INTRAVENOUS
Status: DISCONTINUED | OUTPATIENT
Start: 2017-10-13 | End: 2017-10-13 | Stop reason: HOSPADM

## 2017-10-13 RX ORDER — CARVEDILOL 12.5 MG/1
12.5 TABLET ORAL 2 TIMES DAILY
Status: DISCONTINUED | OUTPATIENT
Start: 2017-10-13 | End: 2017-10-14 | Stop reason: HOSPADM

## 2017-10-13 RX ORDER — POTASSIUM CHLORIDE 29.8 MG/ML
20 INJECTION INTRAVENOUS
Status: DISCONTINUED | OUTPATIENT
Start: 2017-10-13 | End: 2017-10-13

## 2017-10-13 RX ORDER — ADENOSINE 3 MG/ML
12-12000 INJECTION, SOLUTION INTRAVENOUS
Status: DISCONTINUED | OUTPATIENT
Start: 2017-10-13 | End: 2017-10-13

## 2017-10-13 RX ORDER — PROMETHAZINE HYDROCHLORIDE 25 MG/ML
6.25-25 INJECTION, SOLUTION INTRAMUSCULAR; INTRAVENOUS EVERY 4 HOURS PRN
Status: DISCONTINUED | OUTPATIENT
Start: 2017-10-13 | End: 2017-10-13

## 2017-10-13 RX ORDER — NICOTINE POLACRILEX 4 MG
15-30 LOZENGE BUCCAL
Status: DISCONTINUED | OUTPATIENT
Start: 2017-10-13 | End: 2017-10-14 | Stop reason: HOSPADM

## 2017-10-13 RX ADMIN — CARVEDILOL 6.25 MG: 6.25 TABLET, FILM COATED ORAL at 11:43

## 2017-10-13 RX ADMIN — LISINOPRIL 10 MG: 10 TABLET ORAL at 08:20

## 2017-10-13 RX ADMIN — ASPIRIN 81 MG CHEWABLE TABLET 324 MG: 81 TABLET CHEWABLE at 04:50

## 2017-10-13 RX ADMIN — TICAGRELOR 90 MG: 90 TABLET ORAL at 19:31

## 2017-10-13 RX ADMIN — ATORVASTATIN CALCIUM 80 MG: 80 TABLET, FILM COATED ORAL at 08:18

## 2017-10-13 RX ADMIN — IOPAMIDOL 185 ML: 755 INJECTION, SOLUTION INTRAVASCULAR at 05:45

## 2017-10-13 RX ADMIN — HEPARIN SODIUM 15000 UNITS: 1000 INJECTION, SOLUTION INTRAVENOUS; SUBCUTANEOUS at 05:19

## 2017-10-13 RX ADMIN — CARVEDILOL 12.5 MG: 12.5 TABLET, FILM COATED ORAL at 19:31

## 2017-10-13 RX ADMIN — HUMAN ALBUMIN MICROSPHERES AND PERFLUTREN 6 ML: 10; .22 INJECTION, SOLUTION INTRAVENOUS at 08:45

## 2017-10-13 RX ADMIN — MORPHINE SULFATE 4 MG: 4 INJECTION, SOLUTION INTRAMUSCULAR; INTRAVENOUS at 04:12

## 2017-10-13 RX ADMIN — Medication 1500 UNITS: at 05:19

## 2017-10-13 RX ADMIN — NITROGLYCERIN 0.4 MG: 0.4 TABLET SUBLINGUAL at 04:15

## 2017-10-13 RX ADMIN — FENTANYL CITRATE 50 MCG: 50 INJECTION, SOLUTION INTRAMUSCULAR; INTRAVENOUS at 05:11

## 2017-10-13 RX ADMIN — MORPHINE SULFATE 4 MG: 4 INJECTION, SOLUTION INTRAMUSCULAR; INTRAVENOUS at 04:47

## 2017-10-13 RX ADMIN — IOPAMIDOL 100 ML: 755 INJECTION, SOLUTION INTRAVENOUS at 04:48

## 2017-10-13 RX ADMIN — LISINOPRIL 5 MG: 5 TABLET ORAL at 11:43

## 2017-10-13 RX ADMIN — MIDAZOLAM HYDROCHLORIDE 2 MG: 1 INJECTION, SOLUTION INTRAMUSCULAR; INTRAVENOUS at 05:11

## 2017-10-13 RX ADMIN — TICAGRELOR 180 MG: 90 TABLET ORAL at 05:34

## 2017-10-13 RX ADMIN — OXYCODONE HYDROCHLORIDE 5 MG: 5 TABLET ORAL at 13:01

## 2017-10-13 RX ADMIN — INSULIN ASPART 1 UNITS: 100 INJECTION, SOLUTION INTRAVENOUS; SUBCUTANEOUS at 13:10

## 2017-10-13 RX ADMIN — NICOTINE 1 PATCH: 14 PATCH, EXTENDED RELEASE TRANSDERMAL at 08:20

## 2017-10-13 RX ADMIN — LORAZEPAM 0.5 MG: 2 INJECTION INTRAMUSCULAR; INTRAVENOUS at 05:00

## 2017-10-13 RX ADMIN — CARVEDILOL 6.25 MG: 6.25 TABLET, FILM COATED ORAL at 08:20

## 2017-10-13 RX ADMIN — INSULIN ASPART 3 UNITS: 100 INJECTION, SOLUTION INTRAVENOUS; SUBCUTANEOUS at 18:53

## 2017-10-13 RX ADMIN — SODIUM CHLORIDE, PRESERVATIVE FREE 91 ML: 5 INJECTION INTRAVENOUS at 04:49

## 2017-10-13 RX ADMIN — Medication 500 UNITS: at 05:19

## 2017-10-13 RX ADMIN — OXYCODONE HYDROCHLORIDE 5 MG: 5 TABLET ORAL at 15:11

## 2017-10-13 RX ADMIN — SODIUM CHLORIDE: 9 INJECTION, SOLUTION INTRAVENOUS at 06:27

## 2017-10-13 RX ADMIN — ENOXAPARIN SODIUM 40 MG: 40 INJECTION SUBCUTANEOUS at 14:36

## 2017-10-13 ASSESSMENT — ENCOUNTER SYMPTOMS
NAUSEA: 1
SHORTNESS OF BREATH: 1
COUGH: 1
DIAPHORESIS: 1
ABDOMINAL PAIN: 0
FEVER: 0
VOMITING: 0

## 2017-10-13 ASSESSMENT — PAIN DESCRIPTION - DESCRIPTORS
DESCRIPTORS: DISCOMFORT;DULL
DESCRIPTORS: DISCOMFORT;DULL

## 2017-10-13 NOTE — ED NOTES
Patient reports a burning sensation between his shoulder blades and down his back.     He took three 5 mg oxycodones before EMS got there and was crying for pain medicine in the ambulance. The paramedics told him he would not get any narcotic pain medicine and his demeanor changed and he was calm in the ambulance.     He said he lifted for heavy suitcases today and had a course crackly cough for a week

## 2017-10-13 NOTE — PROGRESS NOTES
CLINICAL NUTRITION SERVICES    Reason for Assessment:  Heart-healthy nutrition education, received consult.    Diet History:  Pt was sleeping during time for education, instructed pt's wife on heart healthy education.   Pt's wife reports no history of receiving heart-healthy nutrition education in the past.     Nutrition Diagnosis:  Food- and nutrition-related knowledge deficit r/t no previous knowledge of heart-healthy diet AEB pt report of no previous formal heart-healthy nutrition education.    Nutrition Prescription/Recs:  Continue heart-healthy diet.      Interventions:  Nutrition Education  1.  Provided verbal instruction on a heart-healthy diet.  2.  Provided handouts:  Heart Health Guidelines (includes Heart Healthy Food Choices), Your Heart-Healthy Diet (Words You Will Hear), 10 Tips for Heart-Healthy Cooking, Dining Out With Your Heart In Mind, Recipe Changes for Heart-Healthy Cooking,  How to Read Nutrition Labels, Low-Sodium Foods and Drinks, Tips for a Low-Sodium Diet, and Seasoning Your Foods Without Adding Salt.      Goals:    1.  Pt will verbalize at least four foods high in saturated or trans-fats and five foods high in sodium.    2.  Pt will list at least two interventions to make current meal plan more heart-healthy.     Follow-up:   Patient to ask any further nutrition-related questions before discharge.  In addition, pt may request outpatient RD appointment.    Charlotte Mariscal RD, LD  6C pgr: [phone]

## 2017-10-13 NOTE — IP AVS SNAPSHOT
[address] [address]    Phone:  [phone]                                       After Visit Summary   10/13/2017    Anselmo Gresham    MRN: [id number]           After Visit Summary Signature Page     I have received my discharge instructions, and my questions have been answered. I have discussed any challenges I see with this plan with the nurse or doctor.    ..........................................................................................................................................  Patient/Patient Representative Signature      ..........................................................................................................................................  Patient Representative Print Name and Relationship to Patient    ..................................................               ................................................  Date                                            Time    ..........................................................................................................................................  Reviewed by Signature/Title    ...................................................              ..............................................  Date                                                            Time

## 2017-10-13 NOTE — PLAN OF CARE
Problem: Patient Care Overview  Goal: Plan of Care/Patient Progress Review  Outcome: Improving  S/P PCI Stentx2 LAD  Pt admitted from cath lab. Pt transferred to bed via hovermat and put on monitor.Pt and family oriented to unit. Pt in reverse trendelenberg until 0745. Continue plan of care. OOB today.

## 2017-10-13 NOTE — ED PROVIDER NOTES
History     Chief Complaint   Patient presents with     Back Pain     HPI  Anselmo Gresham is a 52 year old male who presents with severe upper back pain for several hours.  He states the pain started sometime this evening.  He states that sharp, does radiate some to the left chest.  He states it hurts to take a deep breath, and he feels short of breath.  He is also diaphoretic.  He feels nauseous but had no vomiting.  No abdominal pain or diarrhea.  He has had a little bit of a cough.  No fever.  He does have a history of diabetes and hypertension, though denies previous history of MI.  He has smoking history.  He has chronic pain, though states low back pain, and that he doesn't have issues with upper back pain.    Past Medical History:   Diagnosis Date     Diabetes mellitus (H)      Dyspepsia and other specified disorders of function of stomach      Obesity      Unspecified sleep apnea        Past Surgical History:   Procedure Laterality Date     C ANESTH,REPAIR LO HERNIA VENTR/INCIS       HC UGI ENDOSCOPY W EUS  4/2/2012    Procedure:COMBINED ENDOSCOPIC ULTRASOUND, ESOPHAGOSCOPY, GASTROSCOPY, DUODENOSCOPY (EGD); Surgeon:RACHEL DIAZ; Location: GI     HC UGI ENDOSCOPY W EUS  12/17/2013    Procedure: COMBINED ENDOSCOPIC ULTRASOUND, ESOPHAGOSCOPY, GASTROSCOPY, DUODENOSCOPY (EGD);  Surgeon: Rachel Diaz MD;  Location:  GI     HEMORRHOIDECTOMY,EXTERNAL       LAPAROSCOPIC CHOLECYSTECTOMY  4/3/2012    Procedure:LAPAROSCOPIC CHOLECYSTECTOMY; Laparoscopic Cholecystectomy ; Surgeon:LESIA RODRIGUEZ; Location: OR       Family History   Problem Relation Age of Onset     DIABETES Father      CANCER Father      lung and liver       Social History   Substance Use Topics     Smoking status: Current Every Day Smoker     Packs/day: 1.00     Years: 5.00     Types: Cigarettes     Smokeless tobacco: Never Used     Alcohol use Yes      Comment: States occ         I have reviewed the Medications,  "Allergies, Past Medical and Surgical History, and Social History in the Epic system.    Review of Systems   Constitutional: Positive for diaphoresis. Negative for fever.   Respiratory: Positive for cough and shortness of breath.    Cardiovascular: Positive for chest pain.   Gastrointestinal: Positive for nausea. Negative for abdominal pain and vomiting.   All other systems reviewed and are negative.      Physical Exam   BP: (!) 160/110  Pulse: 80  Heart Rate: 83  Temp: 98  F (36.7  C)  Resp: 24  Height: 177.8 cm (5' 10\")  Weight:  (no scale bed)  SpO2: 100 %       Physical Exam   Constitutional: He appears distressed.   HENT:   Head: Atraumatic.   Mouth/Throat: Oropharynx is clear and moist. No oropharyngeal exudate.   Eyes: Pupils are equal, round, and reactive to light. No scleral icterus.   Cardiovascular: Normal heart sounds and intact distal pulses.    Pulmonary/Chest: Breath sounds normal. No respiratory distress.   Abdominal: Soft. There is no tenderness.   Musculoskeletal: He exhibits no edema or tenderness.   Skin: Skin is warm. No rash noted. He is diaphoretic.       ED Course     ED Course     Procedures             EKG Interpretation:      Interpreted by Yaritza Rosado  Time reviewed: 0401  Symptoms at time of EKG: upper back pain   Rhythm: 1 degree AV block  Rate: 85  Axis: Normal  Ectopy: none  Conduction: normal  ST Segments/ T Waves: ST Segement Elevation V1, V2, V3 and V4  Q Waves: none  Comparison to prior: new ST elevation    Clinical Impression: STEMI                  Critical Care time:  was 60 minutes for this patient excluding procedures.   The patient has a STEMI     The patient was evaluated at 0400   Patient was transferred  to the cath lab which was activated due to ECG changes.   ASA given in the ER                 Labs Ordered and Resulted from Time of ED Arrival Up to the Time of Departure from the ED   CBC WITH PLATELETS DIFFERENTIAL - Abnormal; Notable for the following:        " Result Value    WBC 14.5 (*)     Absolute Lymphocytes 5.8 (*)     All other components within normal limits   CREATININE POCT - Abnormal; Notable for the following:     Creatinine 0.3 (*)     All other components within normal limits   BASIC METABOLIC PANEL   TROPONIN I   ISTAT TROPONIN NURSING POCT   TROPONIN POCT            Assessments & Plan (with Medical Decision Making)   The patient was very challenging, as he was repeatedly requesting/demanding pain medication, and not wanting to answer my questions. Regardless, I was quite concerned, as he was diaphoretic, and looked unwell.  EKG was obtained which showed ST elevation in leads V2-V5.  He was given an aspirin immediately as well as nitro, but didn't have improvement of his pain with this.  He was then given morphine, followed by a small dose of Ativan.  Cath lab was immediately activated.  We called 1st stat chest x-ray, given the severe back pain, as I did not feel was appropriate to give heparin or Ticagralor without evaluating the mediastinum, as dissection was on the differential.  Chest x-ray was done, which did show widened mediastinum.  The patient was taken emergently to chest CT, no dissection was found.  He was taken immediately to the Cath Lab following this result.  I was aware that he had not yet received heparin or Ticagralor.    Dictation Disclaimer: Some of this Note has been completed with voice-recognition dictation software. Although errors are generally corrected real-time, there is the potential for a rare error to be present in the completed chart.      I have reviewed the nursing notes.    I have reviewed the findings, diagnosis, plan and need for follow up with the patient.    Current Discharge Medication List          Final diagnoses:   ST elevation myocardial infarction (STEMI), unspecified artery (H)       10/13/2017   [facility], [place], EMERGENCY DEPARTMENT     Yaritza Rosado MD  10/13/17 0814

## 2017-10-13 NOTE — PROGRESS NOTES
CARDIOLOGY PROGRESS NOTE    SUBJECTIVE:  No chest pain last night. No pre-syncope, syncope, or dyspnea. ROS otherwise negative.    OBJECTIVE:  Vital signs:  /102 (Range 140-160/)  HR 80 (77-92); tele without malignant arrhythmias   RR 22 (22-24)  SpO2   T 97.6 (98)  Vitals:    10/13/17 0615   Weight: 127 kg (279 lb 15.8 oz)       I/O: -1050 mL   Intake: No intake charted  Output: -1050 mL     PHYSICAL EXAM:  Gen: Looks well  HEENT: MMM, no carotid bruits  Resp: No signs of resp distress, CTAB  CVS: No JVD, no thrills/heaves, apex undisplaced, S1+S2 without added sounds or murmurs  Abdo: ND, S, NT, +BS  Extremities: Warm, well-perfused, RCFA access site without bruising, thrill, or bruit  Neuro: GCS 15/15, AAOx3     Recent Labs   Lab Test  10/13/17   0411  08/05/16   1136   HGB  15.5  15.5   HCT  45.4  44.9   WBC  14.5*  11.6*   MCV  94  91   MCH  32.2  31.6   MCHC  34.1  34.5   RDW  12.8  12.3   PLT  341  310   NA  139  Canceled, Test credited  140   POTASSIUM  4.4  Canceled, Test credited  3.8   CHLORIDE  104  Canceled, Test credited  104   CO2  21  Canceled, Test credited  27   BUN  20  Canceled, Test credited  17   CR  0.60*  Canceled, Test credited  0.62*   GLC  217*  Canceled, Test credited  115*   MACHO  8.9  Canceled, Test credited  9.5   ALBUMIN   --   3.7   BILITOTAL   --   0.7   ALKPHOS   --   59   AST   --   21   ALT   --   38   Tn     Micro: none     Imaging:  10/13 TTE: awaited    Cardiac meds: ASA 81 mg q24h, atorvastatin 80 mg q24h, carvedilol 6.25 mg BID, lisinopril 10 mg q24h, ticagrelor 90 mg BID  Non-cardiac meds: Insulin, nicotine patch     ASSESSMENT/PLAN:  Mr. Anselmo Gresham is a 52-year old male with a PMHx of T2DM/NIDDM who presented to [facility] on 10/13 with STEMI due to LAD culprit.  He was successfully revascularized.     - STEMI due to LAD culprit   - Await TTE   - MRI for viability    - Increase carvedilol to 12.5 mg BID and lisinopril to 15 mg q24h    - Continue ASA 81  mg q24h, atorvastatin 80 mg q24h, ticagrelor 90 mg BID   - Cardiac rehab   - Smoking cessation    - T2DM   - Monitor glc on SSI and start basal regimen    - Can be discharged with metformin and glipizide    Seen and staffed with Dr. Connors.    Artemio Clark  Cardiology Fellow  Pager [phone]            I have seen and examined the patient and agree with the finding and plan.       Rishi Connors MD  Cardiology-[facility]  [phone]

## 2017-10-13 NOTE — PROCEDURES
CARDIAC CATH REPORT:     PROCEDURES PERFORMED:   1. Selective left and right coronary angiography.  2. Percutaneous coronary intervention of the midLAD.  3. Femoral angiography.  4.Arteriotomy closed with a closure device.  5. Sedation directed by the interventional cardiologist for total time of 23 minutes.    PHYSICIANS:  1. Attending Interventional Cardiology Staff: Rishi Connors MD  2. Interventional Cardiology Fellow: Nir Perla      INDICATION:  Anselmo Gresham is a 52 year old male with hx tobacco use, SANA, HTN, HLD, DM II who presented to ED with 2 day hx of worsening back pain.  EKG showed anterior STEMI.  There was also concern for aortic dissection and he had CT chest that did not show dissection. Emergent coronary angiogram was requested.         DESCRIPTION:  1. Consent obtained with discussion of risks.  All questions were answered.  2. Sterile prep and procedure.  3. Location: right common femoral.  4. Access: Local anesthetic with lidocaine.  A standard (18 g) needle with ultrasound guidance was used to establish vascular access using a modified Seldinger technique.  5. Sheath: 6Fr standard  sheath.  6. Catheters: 6Fr 3DRC, 6Fr XB 3.5 guide.  7. Fluoroscopy time of 6 min.  8. Estimated blood loss of <5 mL.  9. See below for procedure details.    MEDICATIONS:  1. Contrast 185 mL IV.  2. Conscious sedation with fentanyl 200 mcg and midazolam 4mg from 0510 to 0533 for total of 23 minutes as directed by the attending cardiologist.  3. Heparin administered to achieve a goal ACT > 250 sec.   4. Ticagrelor 180 mg po x1.    HEMODYNAMICS:  1. HR 92 bpm  2. Ao /92 mmHg      CORONARY ANGIOGRAM:   1. Both coronary arteries arise from their respective cusps.  2. Left dominant.  3. LM is without angiographic evidence of disease.   4. LAD supplies the entire apex (type 3) and has acute thrombotic occlusion in the mid vessel just distal to the 1st small diagonal and septal branches.    5. LCX is a very  large and dominant vessel and supplies the majority of the inferior wall.  It gives rise to large OM1 and small OM2 and OM3 vessels.  The AV LCx has mild luminal irregularities and gives off a L PDA.  The large OM1 is free of disease.      6. RCA is small and non-dominant and gives off small marginal branches.  It has mild luminal irregularities.     PERCUTANEOUS CORONARY INTERVENTION:  1. LAD Lesion:  A 6Fr XB3.5 guide catheter was positioned at the ostium of the LM.  Heparin was administered to achieve a goal ACT > 250 sec.    A Po Blue wire was advanced across the midLAD lesion and positioned in the distal LAD.  A Pronto aspiration thrombectomy catheter was inserted and advanced back and forth across midLAD lesion 5 times.  After it was removed, a 4.0x12mm Synergy LADONNA was placed in the midLAD.  There was evidence that we didn't cover the most proximal area of the ruptured plaque and a 2nd 4.0x16mm LADONNA was placed more proximally and ballooned with a 4.5x12mm NC balloon.    Final angiography showed no evidence of perforation or dissection with residual stenosis of 0% and CYNTHIA 3 flow.  No complications.    FEMORAL ANGIOGRAPHY  1. The right external iliac and common femoral arteries showed no evidence of significant peripheral artery disease.  Access site was confirmed in the common femoral artery.    ARTERIOTOMY CLOSURE:  1. A 6Fr Angioseal device was used for arteriotomy closure.    COMPLICATIONS:  1. None    SUMMARY:   1. Anterior ST elevation myocardial infarction 2/2 ruptured plaque.  2. Severe 1 vessel coronary artery disease (LAD).  3. Successful deployment of a 4.0x12mm Synergy LADONNA and 4.0x16mm LADONNA to the mid LAD.   5. Arterial access site closed with closure device      PLAN:   1. Aspirin 81 mg po daily lifelong.  2. Ticagrelor 90 mg po bid for at least 1 year uninterrupted.  3. Bedrest per protocol.  4. Admit to the primary inpatient team ([facility]) for further evaluation and management.  5. Continued medical  management and lifestyle modification for cardiovascular risk factor optimization.     The attending interventional cardiologist, Dr Connors, was present for the entire procedure.    Findings discussed with the primary  cardiology team.    See CVIS report for final draft.    Nir Perla  Cardiology fellow  Pager [phone]

## 2017-10-13 NOTE — ED NOTES
"Patient refused to answer all triage questions \"until his pain was fixed\"  He is diaphoretic at the bedside and saying he has numbness in his hands and is short of breath.   "

## 2017-10-13 NOTE — IP AVS SNAPSHOT
MRN:[id number]                      After Visit Summary   10/13/2017    Anselmo Gresham    MRN: [id number]           Thank you!     Thank you for choosing [facility] for your care. Our goal is always to provide you with excellent care. Hearing back from our patients is one way we can continue to improve our services. Please take a few minutes to complete the written survey that you may receive in the mail after you visit with us. Thank you!        Patient Information     Date Of Birth          1965        Designated Caregiver       Most Recent Value    Caregiver    Will someone help with your care after discharge? yes    Name of designated caregiver grandma, kids    Phone number of caregiver na    Caregiver address na      About your hospital stay     You were admitted on:  October 13, 2017 You last received care in the:  [address] [facility]    You were discharged on:  October 14, 2017        Reason for your hospital stay       You had a massive heart attack (STEMI) due to blockage of the largest branch of your heart's blood supply. This weakened your heart muscle. You will need to stop smoking and closely control your diabetes, blood pressure, and cholesterol to strengthen your heart muscle and prevent more heart attacks.                  Who to Call     For medical emergencies, please call 911.  For non-urgent questions about your medical care, please call your primary care provider or clinic, [phone]          Attending Provider     Provider Specialty    Yaritza Rosado MD Emergency Medicine    Rishi Connors MD Cardiology       Primary Care Provider Office Phone # Fax #    Sulma Tracey Sharma [phone]      After Care Instructions     Activity       Your activity upon discharge: activity as tolerated  Please follow-up with cardiac rehab.            Diet       Follow this diet upon discharge: low salt, low fat                  Follow-up Appointments      Adult [facility] Follow-up and recommended labs and tests       1. Primary care doctor, Dr. Sharma, in one week for follow-up after your hospitalization  2. Dr. Connors in one month  3. Outpatient cardiac MRI to examine viability    Appointments on [facility]/or [facility] (with [facility] or [facility] provider or service). Call [phone] if you haven't heard regarding these appointments within 7 days of discharge.            Adult [facility] Follow-up and recommended labs and tests       1. Primary care doctor, Dr. Sharma, in one week for follow-up after your hospitalization and to consider alternate therapies for your diabetes  2. Ann Marie Aburto NP for follow-up of your heart attack in one month  3. Outpatient cardiac MRI to examine viability  4. Outpatient sleep study    Appointments on [facility] (with [facility] or [facility] provider or service). Call [phone] if you haven't heard regarding these appointments within 7 days of discharge.                  Additional Services     CARDIAC REHAB REFERRAL       Patient may choose their preference of the site for Cardiac Rehab.            CARDIAC REHAB REFERRAL       Your provider has referred you to: [facility]: [facility] ([facility]) [facility] [phone]   [email or web address]            Cardiac Rehab Referral           Sleep Study Referral       Suspected sleep apnea with reports from family regarding frequent apnea and hypopnea overnight  Also has large neck on exam and recent STEMI     Please be aware that coverage of these services is subject to the terms and limitations of your health insurance plan.  Call member services at your health plan with any benefit or coverage questions.      Please bring the following to your appointment:    >>   List of current medications   >>   This referral request   >>   Any documents/labs given to you for this referral                  Future tests  "that were ordered for you     MRI Cardiac w/contrast       Cardiac MRI post-STEMI to examine viability. LAD culprit with EF of 40% on TTE after revascularization. PMHx T2DM, smoking.    (Scheduled as OP as scanner was undergoing maintenance while patient was admitted after STEMI)  Administration of IV contrast (contrast agent, dose, and amount) will be tailored to this examination per the appropriate written protocol listed in the Protocol E-Book, or by the supervising imaging provider.                  Pending Results     Date and Time Order Name Status Description    10/14/2017 0933 Basic metabolic panel In process     10/14/2017 0933 Lipid Profile In process     10/14/2017 0804 EKG 12-lead, tracing only Preliminary     10/13/2017 1218 EKG 12-lead, tracing only - STAT Preliminary             Statement of Approval     Ordered          10/14/17 1228  I have reviewed and agree with all the recommendations and orders detailed in this document.  EFFECTIVE NOW     Approved and electronically signed by:  Artemio Clark MD             Admission Information     Date & Time Provider Department Dept. Phone    10/13/2017 Rishi Connors MD [address] [facility] [place] [place] [phone]      Your Vitals Were     Blood Pressure Pulse Temperature Respirations Height Weight    129/86 (BP Location: Right arm) 80 98.3  F (36.8  C) (Axillary) 18 1.778 m (5' 10\") 124.5 kg (274 lb 8 oz)    Pulse Oximetry BMI (Body Mass Index)                91% 39.39 kg/m2          [employer] Information     [employer] lets you send messages to your doctor, view your test results, renew your prescriptions, schedule appointments and more. To sign up, go to [email or web address] . Click on \"Log in\" on the left side of the screen, which will take you to the Welcome page. Then click on \"Sign up Now\" on the right side of the page.     You will be asked to enter the access code listed below, as well as some personal information. Please follow the directions to create " your username and password.     Your access code is: [email or web address]  Expires: 2018  1:39 PM     Your access code will  in 90 days. If you need help or a new code, please call your [facility] clinic or [phone].        Care EveryWhere ID     This is your Care EveryWhere ID. This could be used by other organizations to access your [facility] medical records  [id number]        Equal Access to Services     ARTEMIO DURON : Hadii aad ku hadasho Soomaali, waaxda luqadaha, qaybta kaalmada adeegyada, waxay sylvesterin hayaan adetimbo monterorameshsharmaine wolfe . So [facility] [phone].    ATENCIÓN: Si habla español, tiene a beltran disposición servicios gratuitos de asistencia lingüística. Llame al [phone].    We comply with applicable federal civil rights laws and [place] laws. We do not discriminate on the basis of race, color, national origin, age, disability, sex, sexual orientation, or gender identity.               Review of your medicines      START taking        Dose / Directions    aspirin 81 MG EC tablet   Used for:  ST elevation myocardial infarction (STEMI), unspecified artery (H)        Dose:  81 mg   Take 1 tablet (81 mg) by mouth daily   Quantity:  90 tablet   Refills:  3       atorvastatin 80 MG tablet   Commonly known as:  LIPITOR   Used for:  ST elevation myocardial infarction (STEMI), unspecified artery (H)        Dose:  80 mg   Take 1 tablet (80 mg) by mouth daily   Quantity:  90 tablet   Refills:  3       carvedilol 12.5 MG tablet   Commonly known as:  COREG   Used for:  ST elevation myocardial infarction (STEMI), unspecified artery (H)        Dose:  12.5 mg   Take 1 tablet (12.5 mg) by mouth 2 times daily   Quantity:  120 tablet   Refills:  0       nicotine 14 MG/24HR 24 hr patch   Commonly known as:  NICODERM CQ   Used for:  ST elevation myocardial infarction (STEMI), unspecified artery (H), Tobacco abuse        Dose:  1 patch   Place 1 patch onto the skin daily   Quantity:  30 patch   Refills:  0        ticagrelor 90 MG tablet   Commonly known as:  BRILINTA   Used for:  ST elevation myocardial infarction (STEMI), unspecified artery (H)        Dose:  90 mg   Take 1 tablet (90 mg) by mouth 2 times daily   Quantity:  180 tablet   Refills:  3         CONTINUE these medicines which may have CHANGED, or have new prescriptions. If we are uncertain of the size of tablets/capsules you have at home, strength may be listed as something that might have changed.        Dose / Directions    lisinopril 5 MG tablet   Commonly known as:  PRINIVIL/ZESTRIL   This may have changed:  how much to take   Used for:  ST elevation myocardial infarction (STEMI), unspecified artery (H)        Dose:  15 mg   Take 3 tablets (15 mg) by mouth daily   Quantity:  90 tablet   Refills:  0         CONTINUE these medicines which have NOT CHANGED        Dose / Directions    GLIPIZIDE PO        Take by mouth 2 times daily (before meals)   Refills:  0       metaxalone 800 MG tablet   Commonly known as:  SKELAXIN        Dose:  800 mg   Take 1 tablet (800 mg) by mouth 3 times daily as needed for moderate pain   Quantity:  30 tablet   Refills:  1       METFORMIN HCL PO        Dose:  1000 mg   Take 1,000 mg by mouth 2 times daily (with meals).   Refills:  0       methylPREDNISolone 4 MG tablet   Commonly known as:  MEDROL DOSEPAK        Follow package instructions   Quantity:  21 tablet   Refills:  0       oxyCODONE 5 MG IR tablet   Commonly known as:  ROXICODONE   Used for:  Acute pancreatitis        Dose:  5 mg   Take 1 tablet (5 mg) by mouth every 6 hours as needed   Quantity:  20 tablet   Refills:  0       prochlorperazine 25 MG Suppository   Commonly known as:  COMPAZINE        Dose:  25 mg   Place 1 suppository (25 mg) rectally every 12 hours as needed for nausea   Quantity:  20 suppository   Refills:  1         STOP taking     IBUPROFEN PO           simvastatin 20 MG tablet   Commonly known as:  ZOCOR                Where to get your medicines       These medications were sent to [facility] - [place], [place] - [address]  [address], [place] [address]     Phone:  [phone]     aspirin 81 MG EC tablet    atorvastatin 80 MG tablet    carvedilol 12.5 MG tablet    lisinopril 5 MG tablet    nicotine 14 MG/24HR 24 hr patch    ticagrelor 90 MG tablet                Protect others around you: Learn how to safely use, store and throw away your medicines at www.disposemymeds.org.             Medication List: This is a list of all your medications and when to take them. Check marks below indicate your daily home schedule. Keep this list as a reference.      Medications           Morning Afternoon Evening Bedtime As Needed    aspirin 81 MG EC tablet   Take 1 tablet (81 mg) by mouth daily   Last time this was given:  81 mg on 10/14/2017  9:00 AM   Next Dose Due:  Due tomorrow morning  10/15   8 AM         Due 10/15  8  AM                       atorvastatin 80 MG tablet   Commonly known as:  LIPITOR   Take 1 tablet (80 mg) by mouth daily   Last time this was given:  80 mg on 10/14/2017  9:00 AM   Next Dose Due:  Due tomorrow morning 10/15  8  AM         Due 10/15  8  AM                       carvedilol 12.5 MG tablet   Commonly known as:  COREG   Take 1 tablet (12.5 mg) by mouth 2 times daily   Last time this was given:  12.5 mg on 10/14/2017  9:00 AM   Next Dose Due:  Due this evening 10/14  8 PM         8 AM        Due 10/14  8 PM               GLIPIZIDE PO   Take by mouth 2 times daily (before meals)   Next Dose Due:  Check with cardiologist before restarting                                lisinopril 5 MG tablet   Commonly known as:  PRINIVIL/ZESTRIL   Take 3 tablets (15 mg) by mouth daily   Last time this was given:  15 mg on 10/14/2017  9:01 AM   Next Dose Due:  Due tomorrow morning 10/15  8 AM         Due 10/15  8 AM                       metaxalone 800 MG tablet   Commonly known as:  SKELAXIN   Take 1 tablet (800 mg) by  mouth 3 times daily as needed for moderate pain   Next Dose Due:  May restart any time after discharge to home.  Would be due 10/14  8 PM         8 AM    2 PM    Due 10/14  8 PM               METFORMIN HCL PO   Take 1,000 mg by mouth 2 times daily (with meals).   Next Dose Due:  Check with cardiologist prior to restarting                                methylPREDNISolone 4 MG tablet   Commonly known as:  MEDROL DOSEPAK   Follow package instructions   Next Dose Due:  Check with prescribing physician about restarting.                                nicotine 14 MG/24HR 24 hr patch   Commonly known as:  NICODERM CQ   Place 1 patch onto the skin daily   Last time this was given:  1 patch on 10/13/2017  8:20 AM   Next Dose Due:  Due tomorrow morning 10/15  8 AM         Due 10/15  8 AM                       oxyCODONE 5 MG IR tablet   Commonly known as:  ROXICODONE   Take 1 tablet (5 mg) by mouth every 6 hours as needed   Last time this was given:  5 mg on 10/13/2017  3:11 PM   Next Dose Due:  May take a dose anytime after discharge.                                prochlorperazine 25 MG Suppository   Commonly known as:  COMPAZINE   Place 1 suppository (25 mg) rectally every 12 hours as needed for nausea   Next Dose Due:  Take as prescribe and may restart any time after discharge to home as needed.                                ticagrelor 90 MG tablet   Commonly known as:  BRILINTA   Take 1 tablet (90 mg) by mouth 2 times daily   Last time this was given:  90 mg on 10/14/2017  9:00 AM   Next Dose Due:  Due this evening 10/14  8 PM         8 AM        Due  8/14  8 PM

## 2017-10-13 NOTE — PLAN OF CARE
Problem: Patient Care Overview  Goal: Plan of Care/Patient Progress Review  OT/CR 4E - 6C: Cancel.  Acknowledge order placed for OT/CR eval and treat.  Pt just transferred to new unit, new elevated troponin and plan to go to MRI shortly.  Not appropriate for therapy at this time, will monitor for down-trending trops and/or clearance for activity to initiate CR.

## 2017-10-13 NOTE — PLAN OF CARE
"Problem: Cardiac: ACS (Acute Coronary Syndrome) (Adult)  Goal: Signs and Symptoms of Listed Potential Problems Will be Absent, Minimized or Managed (Cardiac: ACS)  Signs and symptoms of listed potential problems will be absent, minimized or managed by discharge/transition of care (reference Cardiac: ACS (Acute Coronary Syndrome) (Adult) CPG).   Outcome: No Change  D:Transferred from  in a bed.   Upon arrival to unit,  Patient was complaining of pain in upper mid back.  Patient states it is his pain related to his heart.   Nurse which gave report from  indicated patient was \"seen by Cardiologist,  EKG done and no change per MD and had elevated tropoinin which is normal\".   Patient indicated pain had not gotten worse.  Was givne oxycodone 5mg with some relief.   Rhythm is NSR,  No ectopy.   HR 77,  Bp 137/97  .  O2 sat 94% on room air.  Lungs are clear,  Diminished in the lower lobes.  No edema.  Right groin intact, no hematoma.   Denies numbness or tingling.  2+ pedal pulse.     A:Mild upper back pain.  Reliev partially with oxycodone.   Rhythm is stable.  Afebrile and VSS. O2 sat is normal on room air. No signs of complaications related to procedure.  Right groin and CMS and pedal pulse is intact.   Condition is stable upon transfer from .     P;Continue to assess level of comfort.  Medicate prn pain.   EKG with angina.  Notify MD with any acute changes.  Continue to monitor rhtym, temp and vital signs and O2 sats.  Assess right groin and CMS and pedal pulse.  Notify MD with any complications related to procedure.   Up ad neha as tolerated.      "

## 2017-10-13 NOTE — H&P
CSI Admission H&P    CC: Back pain    HPI: 53 yo man with Hx of HTN, DM2, smoking, SANA - presents with 1 day of worsening central back pain radiating to the left chest and with had numbness and diaphoresis. ROS could not obtain due to emergent proceeding to cath lab. Found to have V1-V5 ST elevations on arrival to the ED and widened mediastinum by CXR. CTA showed no dissection. Trop initially negative. S/p cath lab findings as below.    Past Medical History:   Diagnosis Date     Diabetes mellitus (H)      Dyspepsia and other specified disorders of function of stomach      Obesity      Unspecified sleep apnea      Past Surgical History:   Procedure Laterality Date     C ANESTH,REPAIR LO HERNIA VENTR/INCIS       HC UGI ENDOSCOPY W EUS  4/2/2012    Procedure:COMBINED ENDOSCOPIC ULTRASOUND, ESOPHAGOSCOPY, GASTROSCOPY, DUODENOSCOPY (EGD); Surgeon:RACHEL DIAZ; Location:[facility]     HC UGI ENDOSCOPY W EUS  12/17/2013    Procedure: COMBINED ENDOSCOPIC ULTRASOUND, ESOPHAGOSCOPY, GASTROSCOPY, DUODENOSCOPY (EGD);  Surgeon: Rachel Diaz MD;  Location:  GI     HEMORRHOIDECTOMY,EXTERNAL       LAPAROSCOPIC CHOLECYSTECTOMY  4/3/2012    Procedure:LAPAROSCOPIC CHOLECYSTECTOMY; Laparoscopic Cholecystectomy ; Surgeon:LESIA RODRIGUEZ; Location: OR     Family History   Problem Relation Age of Onset     DIABETES Father      CANCER Father      lung and liver     Social History     Social History     Marital status: Single     Spouse name: N/A     Number of children: N/A     Years of education: N/A     Occupational History     manager      Social History Main Topics     Smoking status: Current Every Day Smoker     Packs/day: 1.00     Years: 5.00     Types: Cigarettes     Smokeless tobacco: Never Used     Alcohol use Yes      Comment: States occ     Drug use: No     Sexual activity: Yes     Partners: Female     Other Topics Concern     Not on file     Social History Narrative    No Known Allergies     Meds  reviewed    ==============================  O: Blood pressure (!) 145/102, pulse 80, temperature 98  F (36.7  C), temperature source Oral, resp. rate 24, SpO2 97 %.  gen- middle aged, irritable  pulm- cta, on RA  cv- rrr, no m/r/g  abdmn- soft, nt, nd  extrm- warm, no edema    Labs reviewed, notable for   Cr normal, Trop .032    CXR- 1. Mild patchy bibasilar opacities, favoring atelectasis. 2. The upper mediastinum is not significantly changed from 5/7/2007 when accounting for differences in technique.     Cath report pending    ==============================    A/P: 51 yo man with hx of HTN, DM2, SANA, smoking - presents with STEMI.     #STEMI- proximal LAD, s/p x2 LADONNA  -change home simva to atorva 80  -start metoprolol 25 bid  -hold home lisinopril, restart after BP monitored once out of cath lab  -asa 81, ticagrelor 90 bid   -TTE ordered   -smoking cessation   -will need sleep apnea discussion as outpatient    #DM2- hold home metformin and glipizide, use SSI  #Chronic Pain- cont home oxy q6    Diet- regular  PPx- SCDs, ASA, ticagrelor, anticipate ambulatory soon  Code- presumed full  Dispo- pending bedrest and above w/u    To be staffed in   Gregory Berrios PGY3        I have seen and examined the patient and agree with the finding and plan.       Rishi Connors MD  Cardiology-I  [phone]

## 2017-10-13 NOTE — PROGRESS NOTES
Prior to transport to 6c, pt c/o increased back pain after transferring into chair that similar to what he experienced last night, but less in intensity. Troponin elevated. MD notified, stat EKG obtained. ST elevation remains present, MD at bedside, notified of findings. OK/stable to continue transfer to lower level of care. Pt denied SOB or chest pain. O2 sats >95% on RA. HR SR 80s. Report given to receving RN on 6C, meds sent with PT, pt family notified of transfer.

## 2017-10-14 VITALS
HEART RATE: 80 BPM | WEIGHT: 274.5 LBS | OXYGEN SATURATION: 93 % | DIASTOLIC BLOOD PRESSURE: 78 MMHG | RESPIRATION RATE: 16 BRPM | TEMPERATURE: 98.2 F | BODY MASS INDEX: 39.3 KG/M2 | SYSTOLIC BLOOD PRESSURE: 112 MMHG | HEIGHT: 70 IN

## 2017-10-14 LAB
ANION GAP SERPL CALCULATED.3IONS-SCNC: 14 MMOL/L (ref 3–14)
BUN SERPL-MCNC: 16 MG/DL (ref 7–30)
CALCIUM SERPL-MCNC: 8.6 MG/DL (ref 8.5–10.1)
CHLORIDE SERPL-SCNC: 105 MMOL/L (ref 94–109)
CHOLEST SERPL-MCNC: 133 MG/DL
CO2 SERPL-SCNC: 21 MMOL/L (ref 20–32)
CREAT SERPL-MCNC: 0.61 MG/DL (ref 0.66–1.25)
ERYTHROCYTE [DISTWIDTH] IN BLOOD BY AUTOMATED COUNT: 13.1 % (ref 10–15)
GFR SERPL CREATININE-BSD FRML MDRD: >90 ML/MIN/1.7M2
GLUCOSE BLDC GLUCOMTR-MCNC: 211 MG/DL (ref 70–99)
GLUCOSE BLDC GLUCOMTR-MCNC: 231 MG/DL (ref 70–99)
GLUCOSE BLDC GLUCOMTR-MCNC: 241 MG/DL (ref 70–99)
GLUCOSE BLDC GLUCOMTR-MCNC: 260 MG/DL (ref 70–99)
GLUCOSE BLDC GLUCOMTR-MCNC: 275 MG/DL (ref 70–99)
GLUCOSE BLDC GLUCOMTR-MCNC: 328 MG/DL (ref 70–99)
GLUCOSE SERPL-MCNC: 283 MG/DL (ref 70–99)
HBA1C MFR BLD: 8.5 % (ref 4.3–6)
HCT VFR BLD AUTO: 45.6 % (ref 40–53)
HDLC SERPL-MCNC: 35 MG/DL
HGB BLD-MCNC: 15.3 G/DL (ref 13.3–17.7)
LDLC SERPL CALC-MCNC: 38 MG/DL
MCH RBC QN AUTO: 32.1 PG (ref 26.5–33)
MCHC RBC AUTO-ENTMCNC: 33.6 G/DL (ref 31.5–36.5)
MCV RBC AUTO: 96 FL (ref 78–100)
NONHDLC SERPL-MCNC: 98 MG/DL
PLATELET # BLD AUTO: 294 10E9/L (ref 150–450)
POTASSIUM SERPL-SCNC: 3.8 MMOL/L (ref 3.4–5.3)
RBC # BLD AUTO: 4.76 10E12/L (ref 4.4–5.9)
SODIUM SERPL-SCNC: 140 MMOL/L (ref 133–144)
TRIGL SERPL-MCNC: 300 MG/DL
TROPONIN I SERPL-MCNC: 22.05 UG/L (ref 0–0.04)
WBC # BLD AUTO: 11.6 10E9/L (ref 4–11)

## 2017-10-14 PROCEDURE — 25000128 H RX IP 250 OP 636: Performed by: STUDENT IN AN ORGANIZED HEALTH CARE EDUCATION/TRAINING PROGRAM

## 2017-10-14 PROCEDURE — 25000132 ZZH RX MED GY IP 250 OP 250 PS 637: Performed by: STUDENT IN AN ORGANIZED HEALTH CARE EDUCATION/TRAINING PROGRAM

## 2017-10-14 PROCEDURE — 83036 HEMOGLOBIN GLYCOSYLATED A1C: CPT | Performed by: INTERNAL MEDICINE

## 2017-10-14 PROCEDURE — 84484 ASSAY OF TROPONIN QUANT: CPT | Performed by: STUDENT IN AN ORGANIZED HEALTH CARE EDUCATION/TRAINING PROGRAM

## 2017-10-14 PROCEDURE — 25000132 ZZH RX MED GY IP 250 OP 250 PS 637: Performed by: INTERNAL MEDICINE

## 2017-10-14 PROCEDURE — 93010 ELECTROCARDIOGRAM REPORT: CPT | Performed by: INTERNAL MEDICINE

## 2017-10-14 PROCEDURE — 00000146 ZZHCL STATISTIC GLUCOSE BY METER IP

## 2017-10-14 PROCEDURE — 40000894 ZZH STATISTIC OT IP EVAL DEFER

## 2017-10-14 PROCEDURE — 80048 BASIC METABOLIC PNL TOTAL CA: CPT | Performed by: STUDENT IN AN ORGANIZED HEALTH CARE EDUCATION/TRAINING PROGRAM

## 2017-10-14 PROCEDURE — 25000128 H RX IP 250 OP 636: Performed by: INTERNAL MEDICINE

## 2017-10-14 PROCEDURE — 90686 IIV4 VACC NO PRSV 0.5 ML IM: CPT | Performed by: INTERNAL MEDICINE

## 2017-10-14 PROCEDURE — 93005 ELECTROCARDIOGRAM TRACING: CPT

## 2017-10-14 PROCEDURE — 80061 LIPID PANEL: CPT | Performed by: STUDENT IN AN ORGANIZED HEALTH CARE EDUCATION/TRAINING PROGRAM

## 2017-10-14 PROCEDURE — 36415 COLL VENOUS BLD VENIPUNCTURE: CPT | Performed by: INTERNAL MEDICINE

## 2017-10-14 PROCEDURE — 99238 HOSP IP/OBS DSCHRG MGMT 30/<: CPT | Performed by: INTERNAL MEDICINE

## 2017-10-14 PROCEDURE — 85027 COMPLETE CBC AUTOMATED: CPT | Performed by: INTERNAL MEDICINE

## 2017-10-14 RX ORDER — CARVEDILOL 12.5 MG/1
12.5 TABLET ORAL 2 TIMES DAILY
Qty: 120 TABLET | Refills: 0 | Status: SHIPPED | OUTPATIENT
Start: 2017-10-14 | End: 2017-11-09

## 2017-10-14 RX ORDER — NICOTINE 21 MG/24HR
1 PATCH, TRANSDERMAL 24 HOURS TRANSDERMAL DAILY
Qty: 30 PATCH | Refills: 0 | Status: SHIPPED | OUTPATIENT
Start: 2017-10-14

## 2017-10-14 RX ORDER — LISINOPRIL 5 MG/1
15 TABLET ORAL DAILY
Qty: 90 TABLET | Refills: 0 | Status: SHIPPED | OUTPATIENT
Start: 2017-10-14 | End: 2017-11-09

## 2017-10-14 RX ORDER — POLYETHYLENE GLYCOL 3350 17 G/17G
17 POWDER, FOR SOLUTION ORAL ONCE
Status: COMPLETED | OUTPATIENT
Start: 2017-10-14 | End: 2017-10-14

## 2017-10-14 RX ORDER — ATORVASTATIN CALCIUM 80 MG/1
80 TABLET, FILM COATED ORAL DAILY
Qty: 90 TABLET | Refills: 3 | Status: SHIPPED | OUTPATIENT
Start: 2017-10-14 | End: 2017-11-09

## 2017-10-14 RX ADMIN — LISINOPRIL 15 MG: 10 TABLET ORAL at 09:01

## 2017-10-14 RX ADMIN — Medication 81 MG: at 09:00

## 2017-10-14 RX ADMIN — INSULIN ASPART 5 UNITS: 100 INJECTION, SOLUTION INTRAVENOUS; SUBCUTANEOUS at 16:22

## 2017-10-14 RX ADMIN — INSULIN ASPART 3 UNITS: 100 INJECTION, SOLUTION INTRAVENOUS; SUBCUTANEOUS at 13:57

## 2017-10-14 RX ADMIN — ATORVASTATIN CALCIUM 80 MG: 80 TABLET, FILM COATED ORAL at 09:00

## 2017-10-14 RX ADMIN — ENOXAPARIN SODIUM 40 MG: 40 INJECTION SUBCUTANEOUS at 13:50

## 2017-10-14 RX ADMIN — POLYETHYLENE GLYCOL 3350 17 G: 17 POWDER, FOR SOLUTION ORAL at 16:14

## 2017-10-14 RX ADMIN — NICOTINE 1 PATCH: 14 PATCH, EXTENDED RELEASE TRANSDERMAL at 16:16

## 2017-10-14 RX ADMIN — CARVEDILOL 12.5 MG: 12.5 TABLET, FILM COATED ORAL at 09:00

## 2017-10-14 RX ADMIN — INSULIN ASPART 2 UNITS: 100 INJECTION, SOLUTION INTRAVENOUS; SUBCUTANEOUS at 09:08

## 2017-10-14 RX ADMIN — INFLUENZA A VIRUS A/MICHIGAN/45/2015 X-275 (H1N1) ANTIGEN (FORMALDEHYDE INACTIVATED), INFLUENZA A VIRUS A/HONG KONG/4801/2014 X-263B (H3N2) ANTIGEN (FORMALDEHYDE INACTIVATED), INFLUENZA B VIRUS B/PHUKET/3073/2013 ANTIGEN (FORMALDEHYDE INACTIVATED), AND INFLUENZA B VIRUS B/BRISBANE/60/2008 ANTIGEN (FORMALDEHYDE INACTIVATED) 0.5 ML: 15; 15; 15; 15 INJECTION, SUSPENSION INTRAMUSCULAR at 13:47

## 2017-10-14 RX ADMIN — TICAGRELOR 90 MG: 90 TABLET ORAL at 09:00

## 2017-10-14 NOTE — PLAN OF CARE
Problem: Patient Care Overview  Goal: Plan of Care/Patient Progress Review  OT/CR - OT deferred. Per chart review and discussion with pt, likely short stay with possible discharge this afternoon. Provided pt with heart attack folder, resources, and discussed follow up with OP CR and placed referral to [facility] on this date per pt preference. Pt reports being up with SBA and having no urgent concerns. Confirmed SBA for functional mobility in nursing notes. Will complete orders. Please re-consult if new needs arise or if pt's stay is longer than previously expected.      Anticipate return to home with assist PRN and OP CR follow up to safely increase activity tolerance following MI.

## 2017-10-14 NOTE — DISCHARGE SUMMARY
[facility]  Discharge Summary     Anselmo Gresham MRN# [id number]   YOB: 1965 Age: 52 year old       Admission Date: 10/13/2017  Discharge Date: 10/14/2017    Discharge Diagnoses:  1. ST-elevation myocardial infarction due to proximal LAD culprit lesion  2. Suspicion for SANA    Imaging:  CT Chest, Abdomen, and Pelvis 10/13/2017:  1. No acute pathology of the aorta, including no dissection, penetrating ulcer, or intramural hematoma.  2. Cardiomegaly.  3. Scattered bilateral pulmonary nodules, which are unchanged from 6/5/2006, and are statistically benign by Fleischer Society criteria.    Echocardiogram 10/13/2017:  Technically difficult study.Extremely poor acoustic windows.  Left ventricular size is normal.  Biplane LV EF 44%.  Large LAD territory akinesis.  The inferior vena cava is normal.  Previous study not available for comparison.    Procedures:  1. Coronary Angiogram 10/13/2017:  1. Both coronary arteries arise from their respective cusps.  2. Left dominant.  3. LM is without angiographic evidence of disease.   4. LAD supplies the entire apex (type 3) and has acute thrombotic occlusion in the mid vessel just distal to the 1st small diagonal and septal branches.    5. LCX is a very large and dominant vessel and supplies the majority of the inferior wall.  It gives rise to large OM1 and small OM2 and OM3 vessels.  The AV LCx has mild luminal irregularities and gives off a L PDA.  The large OM1 is free of disease.       6. RCA is small and non-dominant and gives off small marginal branches.  It has mild luminal irregularities.      PERCUTANEOUS CORONARY INTERVENTION:  1. LAD Lesion:  A 6Fr XB3.5 guide catheter was positioned at the ostium of the LM.  Heparin was administered to achieve a goal ACT > 250 sec.    A Po Blue wire was advanced across the midLAD lesion and positioned in the distal LAD.  A Pronto aspiration thrombectomy catheter was inserted and  advanced back and forth across midLAD lesion 5 times.  After it was removed, a 4.0x12mm Synergy LADONNA was placed in the midLAD.  There was evidence that we didn't cover the most proximal area of the ruptured plaque and a 2nd 4.0x16mm LADONNA was placed more proximally and ballooned with a 4.5x12mm NC balloon.    Final angiography showed no evidence of perforation or dissection with residual stenosis of 0% and CYNTHIA 3 flow.  No complications.    FEMORAL ANGIOGRAPHY  1. The right external iliac and common femoral arteries showed no evidence of significant peripheral artery disease.  Access site was confirmed in the common femoral artery.     ARTERIOTOMY CLOSURE:  1. A 6Fr Angioseal device was used for arteriotomy closure.     COMPLICATIONS:  1. None     SUMMARY:   1. Anterior ST elevation myocardial infarction 2/2 ruptured plaque.  2. Severe 1 vessel coronary artery disease (LAD).  3. Successful deployment of a 4.0x12mm Synergy LADONNA and 4.0x16mm LADONNA to the mid LAD.   5. Arterial access site closed with closure device    Consults:  None    HPI (adapted from admission H&P)  53 yo man with Hx of HTN, DM2, smoking, SANA - presents with 1 day of worsening central back pain radiating to the left chest and with had numbness and diaphoresis. ROS could not obtain due to emergent proceeding to cath lab.    Brief Hospital Course  1. STEMI due to proximal LAD culprit lesion   Mr. Gresham was initially seen in the ER. Aortic dissection was ruled out based on CTA. After being found to have ST elevation in the ER, Mr. Gresham was immediately brought to the cath lab. He had LAD stenting performed emergently. Mr. Gresham was then transferred to the CCU, where he his stay was notable for significant uncontrolled hypertension and elevated blood glucose readings.  His carvedilol and lisinopril prescriptions were titrated upwards rapidly to facilitate control of his BP.  Mr. Gresham did not require any organ support or diuresis.  He was then transferred  to the floor.  Mr. Gresham did not experience any recurrences of his atypical back pain (only his chronic MSK back pain), hemodynamic, or electrical instability during his stay in the [facility]. Peak troponin 49.    Mr. Gresham is to see the smoking cessation group, cardiac rehab group and his PCP at discharge.  We would kindly request that his PCP lend particular consideration to the DPP-IV inhibitors, such as sitagliptin, which have been shown to reduce cardiovascular events in view of his premature coronary artery disease.  Mr. Gresham will also follow up with the Cardiology group at [facility] and have an outpatient MRI (for viability)    2. History of apnea and hypopnea, suspected SANA  Mr. Gresham has been scheduled for an outpatient sleep study.     Discharge Information  Discharge diet:  Low fat, low salt  Discharge activity:  Activity as tolerated  Disposition:  Discharged to home    Medication Changes  1. Addition of ASA 81 mg q24h, ticagrelor 90 mg BID, nicotine patch, and atorvastatin 80 mg q24h  2. Discontinuation of simvastatin  3. Lisinopril dose increased to 15 mg q24h  4. Carvedilol dose increased to 12.5 mg BID    Discharge Medications  Current Discharge Medication List      START taking these medications    Details   aspirin EC 81 MG EC tablet Take 1 tablet (81 mg) by mouth daily  Qty: 90 tablet, Refills: 3    Associated Diagnoses: ST elevation myocardial infarction (STEMI), unspecified artery (H)      carvedilol (COREG) 12.5 MG tablet Take 1 tablet (12.5 mg) by mouth 2 times daily  Qty: 120 tablet, Refills: 0    Associated Diagnoses: ST elevation myocardial infarction (STEMI), unspecified artery (H)      nicotine (NICODERM CQ) 14 MG/24HR 24 hr patch Place 1 patch onto the skin daily  Qty: 30 patch, Refills: 0    Associated Diagnoses: ST elevation myocardial infarction (STEMI), unspecified artery (H); Tobacco abuse      ticagrelor (BRILINTA) 90 MG tablet Take 1 tablet (90 mg) by mouth 2 times daily  Qty: 180 tablet,  Refills: 3    Associated Diagnoses: ST elevation myocardial infarction (STEMI), unspecified artery (H)      atorvastatin (LIPITOR) 80 MG tablet Take 1 tablet (80 mg) by mouth daily  Qty: 90 tablet, Refills: 3    Associated Diagnoses: ST elevation myocardial infarction (STEMI), unspecified artery (H)         CONTINUE these medications which have CHANGED    Details   lisinopril (PRINIVIL/ZESTRIL) 5 MG tablet Take 3 tablets (15 mg) by mouth daily  Qty: 90 tablet, Refills: 0    Associated Diagnoses: ST elevation myocardial infarction (STEMI), unspecified artery (H)         CONTINUE these medications which have NOT CHANGED    Details   oxyCODONE (ROXICODONE) 5 MG immediate release tablet Take 1 tablet (5 mg) by mouth every 6 hours as needed  Qty: 20 tablet, Refills: 0    Associated Diagnoses: Acute pancreatitis      GLIPIZIDE PO Take by mouth 2 times daily (before meals)      prochlorperazine (COMPAZINE) 25 MG suppository Place 1 suppository (25 mg) rectally every 12 hours as needed for nausea  Qty: 20 suppository, Refills: 1      metaxalone (SKELAXIN) 800 MG tablet Take 1 tablet (800 mg) by mouth 3 times daily as needed for moderate pain  Qty: 30 tablet, Refills: 1      methylPREDNISolone (MEDROL DOSEPAK) 4 MG tablet Follow package instructions  Qty: 21 tablet, Refills: 0      METFORMIN HCL PO Take 1,000 mg by mouth 2 times daily (with meals).         STOP taking these medications       IBUPROFEN PO Comments:   Reason for Stopping:         SIMVASTATIN 20 MG OR TABS Comments:   Reason for Stopping:             Discharge Follow-up  1. Primary care doctor, Dr. Sharma, in one week for follow-up after your hospitalization and to consider alternate therapies for your diabetes  2. Ann Marie Aburto NP for follow-up of your heart attack in one month  3. Outpatient cardiac MRI to examine viability  4. Outpatient sleep study    Code Status  FULL    CC  Patient Care Team:  Sulma Sharma MD as PCP - General (Family  Practice)        I have seen and examined the patient and agree with the finding and plan.       Rishi Connors MD  Cardiology-[facility]  [phone]

## 2017-10-14 NOTE — PLAN OF CARE
"Problem: Cardiac: ACS (Acute Coronary Syndrome) (Adult)  Goal: Signs and Symptoms of Listed Potential Problems Will be Absent, Minimized or Managed (Cardiac: ACS)  Signs and symptoms of listed potential problems will be absent, minimized or managed by discharge/transition of care (reference Cardiac: ACS (Acute Coronary Syndrome) (Adult) CPG).   D:Patient resting off and on during shift.  Offered pain medication,  Patient declined.   Reports having \"no pain\".   Lungs are clear bilaterally and diminished.   No shortness of breath.  Right groin is flat, dry and intact.   Denies numbness or tingling of right lower extremity.   Up ad neha in room   Rhythm is NSR,  HR upper 80's with no ectopy.   Bp 129/86 O2 sat  Low 90's on room air. Troponin 22.049 from .78288 yesterday.   Temp 98.3,      A:Is free of pain.  Tolerating activity independently.  Right groin has no signs of bleeding.   CMS and pedal and post tibial pulses on the right are intact.   Rhythm is stable.   Afebrile  And VSS.   Condition is improved and adequate for discharge to home today.    P;Discharge to home today with family with Heart failure teaching and education material as well as medication educations and post procedure education.       "

## 2017-10-14 NOTE — PROGRESS NOTES
DISCHARGE   Discharged to: Home  Via: Automobile  Accompanied by: Family, sister  Discharge Instructions: diet, activity, medications, follow up appointments, when to call the MD, and what to watchout for (i.e. s/s of infection, increasing SOB, palpitations, chest pain,) Patient received Heart Failure teaching and written education material and weight management as well as reportable signs and symptoms.  Patient received education material on all discharge medication listed that were sent to pharmacy.  Patient received verbal instructions related potential complications post procedure, bleeding change in sensation, chest pain shortness breath activity  Intolerance.  And action to take ( seek medical attention or seek emergency care).  Prescriptions: To be filled by  [facility]      pharmacy per pt's request; medication list reviewed & sent with pt  Follow Up Appointments: arranged; information given  Belongings: All sent with pt  IV: out  Telemetry: off  Pt exhibits understanding of above discharge instructions; all questions answered.  Discharge Paperwork: faxed

## 2017-10-14 NOTE — PROGRESS NOTES
CARDIOLOGY PROGRESS NOTE    SUBJECTIVE:  No chest pain. Back pain much better controlled with lidocaine patch and home doses of oxycodone. No palpitations or pre-syncope.    ROS otherwise negative.    OBJECTIVE:  Vital signs:  /91 (Range 121-165/)  HR 80 (77-98). Tele with malignant arrhythmias.   RR 18 18  SpO2 93-99%  T 97.7 (Tm 37)  Vitals:    10/13/17 0615 10/14/17 0616   Weight: 127 kg (279 lb 15.8 oz) 124.5 kg (274 lb 8 oz)     I/O: -1593 mL  Intake: 656 intake (540 PO, 116 IV)  Output: 2250 mL UO     PHYSICAL EXAM:  Gen: Looks well  HEENT: MMM, large neck  Resp: No signs of resp distress, CTAB this morning   CVS: No JVD, S1+S2 without added sounds or murmurs  Abdo: ND, S, NT, +BS  Extremities: Warm, well-perfused. No edema. RCFA access site without thrill, bruit, hematoma. Good DP/PT/popliteal pulses bilaterally.  Neuro: GCS 15/15, AAOx3     Recent Labs   Lab Test  10/13/17   0411  08/05/16   1136   HGB  15.5  15.5   HCT  45.4  44.9   WBC  14.5*  11.6*   MCV  94  91   MCH  32.2  31.6   MCHC  34.1  34.5   RDW  12.8  12.3   PLT  341  310   NA  139  Canceled, Test credited  140   POTASSIUM  4.4  Canceled, Test credited  3.8   CHLORIDE  104  Canceled, Test credited  104   CO2  21  Canceled, Test credited  27   BUN  20  Canceled, Test credited  17   CR  0.60*  Canceled, Test credited  0.62*   GLC  217*  Canceled, Test credited  115*   MACHO  8.9  Canceled, Test credited  9.5   ALBUMIN   --   3.7   BILITOTAL   --   0.7   ALKPHOS   --   59   AST   --   21   ALT   --   38       Micro: none  Imaging:  10/13 TTE:  Interpretation Summary  Technically difficult study. Extremely poor acoustic windows.  Left ventricular size is normal.  Biplane LV EF 44%.  Large LAD territory akinesis.  The inferior vena cava is normal.  Previous study not available for comparison.    Cardiac meds: ASA 81 mg q24h, atorvastatin 80 mg q24h, carvedilol 12.5 mg BID, lisinopril 15 mg q24h, and  ticagrelor 90 mg  BID  Non-cardiac meds: Enoxaparin, insulin, lidocaine patch, nicotine patch, oxycodone 10 mg q6h PRN   Drips: none    ASSESSMENT/PLAN:  Mr. Anselmo Gresham is a 52-year old male with a PMHx of T2DM/NIDDM who presented to [facility] on 10/13 with STEMI due to LAD culprit.  He was successfully revascularized.      - STEMI due to LAD culprit; newly compensated systolic heart failure   - Continue above medical regimen at discharge   - OP MRI   - OP sleep study   - OP Cardiac rehab   - Nicotine patches at discharge    - T2DM   - Restart home regimen of metformin and glipizide at discharge    - Follow-up within one week     Seen and staffed with Dr. oCnnors.    Artemio Clark  Cardiology Fellow  Pager [phone]            I have seen and examined the patient and agree with the finding and plan.       Rishi Connors MD  Cardiology-[facility]  [phone]

## 2017-10-14 NOTE — PLAN OF CARE
Problem: Patient Care Overview  Goal: Plan of Care/Patient Progress Review  Outcome: Improving  Pt A/Ox4, VSS on 2L O2 for comfort. Pt's BG's elevated, Insulin given per sliding scale. Pt up with SBA, denies pain. Awaiting cardiac MRI, Likely DC after. Right groin intact. Continue to monitor and notify MD of questions or concerns.

## 2017-10-16 ENCOUNTER — CARE COORDINATION (OUTPATIENT)
Dept: CARDIOLOGY | Facility: CLINIC | Age: 52
End: 2017-10-16

## 2017-10-16 ENCOUNTER — CARE COORDINATION (OUTPATIENT)
Dept: CARE COORDINATION | Facility: CLINIC | Age: 52
End: 2017-10-16

## 2017-10-16 LAB
INTERPRETATION ECG - MUSE: NORMAL
INTERPRETATION ECG - MUSE: NORMAL

## 2017-10-17 ENCOUNTER — TELEPHONE (OUTPATIENT)
Dept: CARDIOLOGY | Facility: CLINIC | Age: 52
End: 2017-10-17

## 2017-10-17 NOTE — PROGRESS NOTES
[facility]: Post-Discharge Note  SITUATION                                                      Admission:    Admission Date: 10/13/17   Reason for Admission: ST-elevation myocardial infarction due to proximal LAD culprit lesion  Discharge:    Discharge Date: 10/14/17   Discharge Diagnosis: ST-elevation myocardial infarction due to proximal LAD culprit lesion   Discharge Service: Caediology         ASSESSMENT                Patient was called three times and no answer so post 24 hr DC follow up calls will be closed out, message was left with contact number for department seen by or following up with            PLAN                                                          No future appointments.        Yuni De Jesus RN

## 2017-10-24 ENCOUNTER — HOSPITAL ENCOUNTER (EMERGENCY)
Facility: CLINIC | Age: 52
Discharge: HOME | End: 2017-10-24
Attending: EMERGENCY MEDICINE | Admitting: EMERGENCY MEDICINE
Payer: COMMERCIAL

## 2017-10-24 VITALS
RESPIRATION RATE: 18 BRPM | TEMPERATURE: 96.3 F | WEIGHT: 270 LBS | BODY MASS INDEX: 38.74 KG/M2 | SYSTOLIC BLOOD PRESSURE: 132 MMHG | DIASTOLIC BLOOD PRESSURE: 95 MMHG | OXYGEN SATURATION: 96 %

## 2017-10-24 DIAGNOSIS — M54.41 RIGHT-SIDED LOW BACK PAIN WITH RIGHT-SIDED SCIATICA, UNSPECIFIED CHRONICITY: ICD-10-CM

## 2017-10-24 PROCEDURE — 25000131 ZZH RX MED GY IP 250 OP 636 PS 637: Performed by: EMERGENCY MEDICINE

## 2017-10-24 PROCEDURE — 99283 EMERGENCY DEPT VISIT LOW MDM: CPT | Performed by: EMERGENCY MEDICINE

## 2017-10-24 PROCEDURE — 99284 EMERGENCY DEPT VISIT MOD MDM: CPT | Mod: Z6 | Performed by: EMERGENCY MEDICINE

## 2017-10-24 PROCEDURE — 25000132 ZZH RX MED GY IP 250 OP 250 PS 637: Performed by: EMERGENCY MEDICINE

## 2017-10-24 RX ORDER — CYCLOBENZAPRINE HCL 10 MG
10 TABLET ORAL ONCE
Status: COMPLETED | OUTPATIENT
Start: 2017-10-24 | End: 2017-10-24

## 2017-10-24 RX ORDER — PREDNISONE 50 MG/1
50 TABLET ORAL ONCE
Status: COMPLETED | OUTPATIENT
Start: 2017-10-24 | End: 2017-10-24

## 2017-10-24 RX ORDER — METHYLPREDNISOLONE 4 MG
TABLET, DOSE PACK ORAL
Qty: 21 TABLET | Refills: 0 | Status: SHIPPED | OUTPATIENT
Start: 2017-10-24

## 2017-10-24 RX ORDER — CYCLOBENZAPRINE HCL 10 MG
10 TABLET ORAL 3 TIMES DAILY PRN
Qty: 90 TABLET | Refills: 1 | Status: SHIPPED | OUTPATIENT
Start: 2017-10-24

## 2017-10-24 RX ADMIN — CYCLOBENZAPRINE HYDROCHLORIDE 10 MG: 10 TABLET, FILM COATED ORAL at 06:49

## 2017-10-24 RX ADMIN — PREDNISONE 50 MG: 50 TABLET ORAL at 06:58

## 2017-10-24 ASSESSMENT — ENCOUNTER SYMPTOMS
ABDOMINAL PAIN: 0
FEVER: 0
BACK PAIN: 1
SHORTNESS OF BREATH: 0

## 2017-10-24 NOTE — ED AVS SNAPSHOT
[facility], Emergency Department    [address]    [place] [address]    Phone:  [phone]    Fax:  [phone]                                       Anselmo Gresham   MRN: [id number]    Department:  [facility], Emergency Department   Date of Visit:  10/24/2017           Patient Information     Date Of Birth          1965        Your diagnoses for this visit were:     Right-sided low back pain with right-sided sciatica, unspecified chronicity        You were seen by Yaritza Rosado MD.        Discharge Instructions         General Neck and Back Pain    Both neck and back pain are usually caused by injury to the muscles or ligaments of the spine. Sometimes the disks that separate each bone of the spine may cause pain by pressing on a nearby nerve. Back and neck pain may appear after a sudden twisting or bending force (such as in a car accident), or sometimes after a simple awkward movement. In either case, muscle spasm is often present and adds to the pain.  Acute neck and back pain usually gets better in 1 to 2 weeks. Pain related to disk disease, arthritis in the spinal joints or spinal stenosis (narrowing of the spinal canal) can become chronic and last for months or years.  Back and neck pain are common problems. Most people feel better in 1 or 2 weeks, and most of the rest in 1 to 2 months. Most people can remain active.  People experience and describe pain differently.    Pain can be sharp, stabbing, shooting, aching, cramping, or burning    Movement, standing, bending, lifting, sitting, or walking may worsen the pain    Pain can be localized to one spot or area, or it can be more generalized    Pain can spread or radiate upwards, downwards, to the front, or go down your arms    Muscle spasm may occur.  Most of the time mechanical problems with the muscles or spine cause the pain. it is usually caused by an injury, whether known or not, to the muscles or ligaments. While illnesses can  cause back pain, it is usually not caused by a serious illness. Pain is usually related to physical activity, whether sports, exercise, work, or normal activity. Sometimes it can occur without an identifiable cause. This can happen simply by stretching or moving wrong, without noting pain at the time. Other causes include:    Overexertion, lifting, pushing, pulling incorrectly or too aggressively.    Sudden twisting, bending or stretching from an accident (car or fall), or accidental movement.    Poor posture    Poor conditioning, lack of regular exercise    Spinal disc disease or arthritis    Stress    Pregnancy, or illness like appendicitis, bladder or kidney infection, pelvic infections   Home care    For neck pain: Use a comfortable pillow that supports the head and keeps the spine in a neutral position. The position of the head should not be tilted forward or backward.    When in bed, try to find a position of comfort. A firm mattress is best. Try lying flat on your back with pillows under your knees. You can also try lying on your side with your knees bent up towards your chest and a pillow between your knees.    At first, do not try to stretch out the sore spots. If there is a strain, it is not like the good soreness you get after exercising without an injury. In this case, stretching may make it worse.    Avoid prolonged sitting, long car rides or travel. This puts more stress on the lower back than standing or walking.    During the first 24 to 72 hours after an injury, apply an ice pack to the painful area for 20 minutes and then remove it for 20 minutes over a period of 60 to 90 minutes or several times a day.     You can alternate ice and heat therapies. Talk with your healthcare provider about the best treatment for your back or neck pain. As a safety precaution, do not use a heating pad at bedtime. Sleeping with a heating pad can lead to skin burns or tissue damage.    Therapeutic massage can help  relax the back and neck muscles without stretching them.    Be aware of safe lifting methods and do not lift anything over 15 pounds until all the pain is gone.  Medications  Talk to your healthcare provider before using medicine, especially if you have other medical problems or are taking other medicines.    You may use over-the-counter medicine to control pain, unless another pain medicine was prescribed. If you have chronic conditions like diabetes, liver or kidney disease, stomach ulcers,  gastrointestinal bleeding, or are taking blood thinner medicines.    Be careful if you are given pain medicines, narcotics, or medicine for muscle spasm. They can cause drowsiness, and can affect your coordination, reflexes, and judgment. Do not drive or operate heavy machinery.  Follow-up care  Follow up with your healthcare provider, or as advised. Physical therapy or further tests may be needed.  If X-rays were taken, you will be notified of any new findings that may affect your care.  Call 911  Seek emergency medical care if any of the following occur:    Trouble breathing    Confusion    Very drowsy or trouble awakening    Fainting or loss of consciousness    Rapid or very slow heart rate    Loss of bowel or bladder control  When to seek medical advice  Call your healthcare provider right away if any of these occur:    Pain becomes worse or spreads into your arms or legs    Weakness, numbness or pain in one or both arms or legs    Numbness in the groin area    Difficulty walking    Fever of 100.4 F (38 C) or higher, or as directed by your healthcare provider  Date Last Reviewed: 7/1/2016 2000-2017 [employer]. [address] [place] [id number]. All rights reserved. This information is not intended as a substitute for professional medical care. Always follow your healthcare professional's instructions.          Back Care Tips    Caring for your back  These are things you can do to prevent a  recurrence of acute back pain and to reduce symptoms from chronic back pain:    Maintain a healthy weight. If you are overweight, losing weight will help most types of back pain.    Exercise is an important part of recovery from most types of back pain. The muscles behind and in front of the spine support the back. This means strengthening both the back muscles and the abdominal muscles will provide better support for your spine.     Swimming and brisk walking are good overall exercises to improve your fitness level.    Practice safe lifting methods (below).    Practice good posture when sitting, standing and walking. Avoid prolonged sitting. This puts more stress on the lower back than standing or walking.    Wear quality shoes with sufficient arch support. Foot and ankle alignment can affect back symptoms. Women should avoid wearing high heels.    Therapeutic massage can help relax the back muscles without stretching them.    During the first 24 to 72 hours after an acute injury or flare-up of chronic back pain, apply an ice pack to the painful area for 20 minutes and then remove it for 20 minutes, over a period of 60 to 90 minutes, or several times a day. As a safety precaution, do not use a heating pad at bedtime. Sleeping on a heating pad can lead to skin burns or tissue damage.    You can alternate ice and heat therapies.  Medications  Talk to your healthcare provider before using medicines, especially if you have other medical problems or are taking other medicines.    You may use acetaminophen or ibuprofen to control pain, unless your healthcare provider prescribed other pain medicine. If you have chronic conditions like diabetes, liver or kidney disease, stomach ulcers, or gastrointestinal bleeding, or are taking blood thinners, talk with your healthcare provider before taking any medicines.    Be careful if you are given prescription pain medicines, narcotics, or medicine for muscle spasm. They can cause  drowsiness, affect your coordination, reflexes, and judgment. Do not drive or operate heavy machinery while taking these types of medicines. Take prescription pain medicine only as prescribed by your healthcare provider.  Lumbar stretch  Here is a simple stretching exercise that will help relax muscle spasm and keep your back more limber. If exercise makes your back pain worse, don t do it.    Lie on your back with your knees bent and both feet on the ground.    Slowly raise your left knee to your chest as you flatten your lower back against the floor. Hold for 5 seconds.    Relax and repeat the exercise with your right knee.    Do 10 of these exercises for each leg.  Safe lifting method    Don t bend over at the waist to lift an object off the floor.  Instead, bend your knees and hips in a squat.     Keep your back and head upright    Hold the object close to your body, directly in front of you.    Straighten your legs to lift the object.     Lower the object to the floor in the reverse fashion.    If you must slide something across the floor, push it.  Posture tips  Sitting  Sit in chairs with straight backs or low-back support. Keep your knees lower than your hips, with your feet flat on the floor.  When driving, sit up straight. Adjust the seat forward so you are not leaning toward the steering wheel.  A small pillow or rolled towel behind your lower back may help if you are driving long distances.   Standing  When standing for long periods, shift most of your weight to one leg at a time. Alternate legs every few minutes.   Sleeping  The best way to sleep is on your side with your knees bent. Put a low pillow under your head to support your neck in a neutral spine position. Avoid thick pillows that bend your neck to one side. Put a pillow between your legs to further relax your lower back. If you sleep on your back, put pillows under your knees to support your legs in a slightly flexed position. Use a firm  mattress. If your mattress sags, replace it, or use a 1/2-inch plywood board under the mattress to add support.  Follow-up care  Follow up with your healthcare provider, or as advised.  If X-rays, a CT scan or an MRI scan were taken, they will be reviewed by a radiologist. You will be notified of any new findings that may affect your care.  Call 911  Seek emergency medical care if any of the following occur:    Trouble breathing    Confusion    Very drowsy    Fainting or loss of consciousness    Rapid or very slow heart rate    Loss of  bowel or bladder control  When to seek medical care  Call your healthcare provider if any of the following occur:    Pain becomes worse or spreads to your arms or legs    Weakness or numbness in one or both arms or legs    Numbness in the groin area  Date Last Reviewed: 6/1/2016 2000-2017 [employer]. [address] [place] [address]. All rights reserved. This information is not intended as a substitute for professional medical care. Always follow your healthcare professional's instructions.        Ortho walk-in clinic:  [facility] [place] [address]  7 days a week, 7 a.m. to 7 p.m.  Contact  Information: [phone]       The steroid can affect your blood sugars (temporarily) - monitor your sugars closely while taking the steroid.       24 Hour Appointment Hotline       To make an appointment at any [facility] [place], call [phone] ([phone]). If you don't have a family doctor or clinic, we will help you find one. [facility] clinics are conveniently located to serve the needs of you and your family.             Review of your medicines      START taking        Dose / Directions Last dose taken    cyclobenzaprine 10 MG tablet   Commonly known as:  FLEXERIL   Dose:  10 mg   Quantity:  90 tablet        Take 1 tablet (10 mg) by mouth 3 times daily as needed for muscle spasms    Refills:  1        methylPREDNISolone 4 MG tablet   Commonly known as:  MEDROL DOSEPAK   Quantity:  21 tablet   Replaces:  methylPREDNISolone 4 MG tablet        Follow package instructions   Refills:  0          Our records show that you are taking the medicines listed below. If these are incorrect, please call your family doctor or clinic.        Dose / Directions Last dose taken    aspirin 81 MG EC tablet   Dose:  81 mg   Quantity:  90 tablet        Take 1 tablet (81 mg) by mouth daily   Refills:  3        atorvastatin 80 MG tablet   Commonly known as:  LIPITOR   Dose:  80 mg   Quantity:  90 tablet        Take 1 tablet (80 mg) by mouth daily   Refills:  3        carvedilol 12.5 MG tablet   Commonly known as:  COREG   Dose:  12.5 mg   Quantity:  120 tablet        Take 1 tablet (12.5 mg) by mouth 2 times daily   Refills:  0        GLIPIZIDE PO        Take by mouth 2 times daily (before meals)   Refills:  0        lisinopril 5 MG tablet   Commonly known as:  PRINIVIL/ZESTRIL   Dose:  15 mg   Quantity:  90 tablet        Take 3 tablets (15 mg) by mouth daily   Refills:  0        metaxalone 800 MG tablet   Commonly known as:  SKELAXIN   Dose:  800 mg   Quantity:  30 tablet        Take 1 tablet (800 mg) by mouth 3 times daily as needed for moderate pain   Refills:  1        METFORMIN HCL PO   Dose:  1000 mg        Take 1,000 mg by mouth 2 times daily (with meals).   Refills:  0        nicotine 14 MG/24HR 24 hr patch   Commonly known as:  NICODERM CQ   Dose:  1 patch   Quantity:  30 patch        Place 1 patch onto the skin daily   Refills:  0        oxyCODONE 5 MG IR tablet   Commonly known as:  ROXICODONE   Dose:  5 mg   Quantity:  20 tablet        Take 1 tablet (5 mg) by mouth every 6 hours as needed   Refills:  0        prochlorperazine 25 MG Suppository   Commonly known as:  COMPAZINE   Dose:  25 mg   Quantity:  20 suppository        Place 1 suppository (25 mg) rectally every 12 hours as needed for nausea   Refills:  " 1        ticagrelor 90 MG tablet   Commonly known as:  BRILINTA   Dose:  90 mg   Quantity:  180 tablet        Take 1 tablet (90 mg) by mouth 2 times daily   Refills:  3          STOP taking        Dose Reason for stopping Comments    methylPREDNISolone 4 MG tablet   Commonly known as:  MEDROL DOSEPAK   Replaced by:  methylPREDNISolone 4 MG tablet                      Prescriptions were sent or printed at these locations (2 Prescriptions)                   Other Prescriptions                Printed at Department/Unit printer (2 of 2)         methylPREDNISolone (MEDROL DOSEPAK) 4 MG tablet               cyclobenzaprine (FLEXERIL) 10 MG tablet                Orders Needing Specimen Collection     None      Pending Results     No orders found from 10/22/2017 to 10/25/2017.            Pending Culture Results     No orders found from 10/22/2017 to 10/25/2017.            Pending Results Instructions     If you had any lab results that were not finalized at the time of your Discharge, you can call the ED Lab Result RN at [phone]. You will be contacted by this team for any positive Lab results or changes in treatment. The nurses are available 7 days a week from 10A to 6:30P.  You can leave a message 24 hours per day and they will return your call.        Thank you for choosing [facility]       Thank you for choosing [facility] for your care. Our goal is always to provide you with excellent care. Hearing back from our patients is one way we can continue to improve our services. Please take a few minutes to complete the written survey that you may receive in the mail after you visit with us. Thank you!        [employer] Safety Information     [employer] lets you send messages to your doctor, view your test results, renew your prescriptions, schedule appointments and more. To sign up, go to [email or web address] . Click on \"Log in\" on the left side of the screen, which will take you to the Welcome page. Then click on \"Sign up Now\" on " the right side of the page.     You will be asked to enter the access code listed below, as well as some personal information. Please follow the directions to create your username and password.     Your access code is: [email or web address]  Expires: 2018  1:39 PM     Your access code will  in 90 days. If you need help or a new code, please call your [facility] clinic or [phone].        Care EveryWhere ID     This is your Care EveryWhere ID. This could be used by other organizations to access your [facility] medical records  [id number]        Equal Access to Services     [facility] Center[facility] : Emmy Silva, john montesinos, mame cool, rocio wolfe . So [facility] [phone].    ATENCIÓN: Si habla español, tiene a beltran disposición servicios gratuitos de asistencia lingüística. Llame al [phone].    We comply with applicable federal civil rights laws and [place] laws. We do not discriminate on the basis of race, color, national origin, age, disability, sex, sexual orientation, or gender identity.            After Visit Summary       This is your record. Keep this with you and show to your community pharmacist(s) and doctor(s) at your next visit.

## 2017-10-24 NOTE — ED PROVIDER NOTES
History     Chief Complaint   Patient presents with     Back Pain     Right sided pain from lower back all the down leg, sharp pain, foot numb. Hx MI 13 days ago, stents placed.     HPI  Anselmo Gresham is a 52 year old male who presents with acute worsening of chronic right lower back pain radiating down the right leg.  He states he has some chronic pain in this area, though it's been much worse since yesterday, when he went to the chiropractor.  He started having some numbness in the right lateral leg, as well as the sole of his foot and lateral right toes since then as well.  No loss of bowel or bladder control, no new weakness.  No other acute injuries.  This is only his 2nd car parked adjustment.  No abdominal pain, vomiting, diarrhea, fever.  Of note, he did have a STEMI a couple of weeks ago, but denies pain in the right groin, which was the puncture site.  No chest pain or upper back pain at this time.  He admits that the current symptoms are similar to his chronic symptoms are not worse, other than the numbness, which is new since yesterday. Pain is much worse with movement.  He does have oxycodone at home which she is currently prescribed, and states he took 20 mg last night, and 20 mg this morning.    Past Medical History:   Diagnosis Date     Diabetes mellitus (H)      Dyspepsia and other specified disorders of function of stomach      Myocardial infarction     10/12/2017     Obesity      Unspecified sleep apnea        Past Surgical History:   Procedure Laterality Date     C ANESTH,REPAIR LO HERNIA VENTR/INCIS       CARDIAC SURGERY      2 stents placed     HC UGI ENDOSCOPY W EUS  4/2/2012    Procedure:COMBINED ENDOSCOPIC ULTRASOUND, ESOPHAGOSCOPY, GASTROSCOPY, DUODENOSCOPY (EGD); Surgeon:RACHEL DIAZ; Location:[facility]     HC UGI ENDOSCOPY W EUS  12/17/2013    Procedure: COMBINED ENDOSCOPIC ULTRASOUND, ESOPHAGOSCOPY, GASTROSCOPY, DUODENOSCOPY (EGD);  Surgeon: Rachel Diaz MD;  Location:  GI      HEMORRHOIDECTOMY,EXTERNAL       LAPAROSCOPIC CHOLECYSTECTOMY  4/3/2012    Procedure:LAPAROSCOPIC CHOLECYSTECTOMY; Laparoscopic Cholecystectomy ; Surgeon:LESIA RODRIGUEZ; Location:[facility]       Family History   Problem Relation Age of Onset     DIABETES Father      CANCER Father      lung and liver       Social History   Substance Use Topics     Smoking status: Former Smoker     Years: 0.00     Types: Cigarettes     Quit date: 10/11/2017     Smokeless tobacco: Never Used     Alcohol use Yes      Comment: States occ         I have reviewed the Medications, Allergies, Past Medical and Surgical History, and Social History in the Epic system.    Review of Systems   Constitutional: Negative for fever.   Respiratory: Negative for shortness of breath.    Cardiovascular: Negative for chest pain.   Gastrointestinal: Negative for abdominal pain.   Musculoskeletal: Positive for back pain.   All other systems reviewed and are negative.      Physical Exam   BP: (!) 132/95  Heart Rate: 74  Temp: 96.3  F (35.7  C)  Resp: 18  Weight: 122.5 kg (270 lb)  SpO2: 96 %      Physical Exam   Constitutional: No distress.   HENT:   Head: Atraumatic.   Eyes: No scleral icterus.   Cardiovascular: Normal heart sounds and intact distal pulses.    Pulmonary/Chest: Breath sounds normal. No respiratory distress.   Abdominal: Soft. Bowel sounds are normal. There is no tenderness.   Musculoskeletal: He exhibits tenderness. He exhibits no edema.   Mild right low back tenderness. No swelling/tenderness in the right groin.  Distal pulses intact.  Decreased sensation over right lateral thigh, right lateral lower leg, sole of foot, and lateral right toes.  Strength normal and equal bilaterally.  No saddle anesthesia.   Skin: Skin is warm. No rash noted. He is not diaphoretic.       ED Course     ED Course     Procedures             Critical Care time:  none             Labs Ordered and Resulted from Time of ED Arrival Up to the Time of  Departure from the ED - No data to display         Assessments & Plan (with Medical Decision Making)   The patient has radicular back pain, though no signs or symptoms of acute cauda equina syndrome.  I do think it is likely that he's got a herniated disc.  Given the lack of signs or symptoms of cauda equina syndrome, I don't think emergent MRI is warranted at this time from the ER.  He will be given a dose of prednisone here, percription for Medrol Dosepak and Flexeril.  He will be directed to closely monitor his blood sugars while taking the Medrol.  He did ask if there was orthopedist he could see today, I do given the information of [place] [facility] [place]  Signs and symptoms of cauda equina syndrome and indications for urgent return were discussed.  There is nothing to suggest that this is related to his angiogram, and is no evidence for ischemia.    I have reviewed the nursing notes.    I have reviewed the findings, diagnosis, plan and need for follow up with the patient.    Discharge Medication List as of 10/24/2017  7:01 AM      START taking these medications    Details   methylPREDNISolone (MEDROL DOSEPAK) 4 MG tablet Follow package instructions, Disp-21 tablet, R-0, Local Print      cyclobenzaprine (FLEXERIL) 10 MG tablet Take 1 tablet (10 mg) by mouth 3 times daily as needed for muscle spasms, Disp-90 tablet, R-1, Local Print             Final diagnoses:   Right-sided low back pain with right-sided sciatica, unspecified chronicity       10/24/2017   [facility], [place], EMERGENCY DEPARTMENT     Yaritza Rosado MD  10/24/17 0717

## 2017-10-24 NOTE — ED AVS SNAPSHOT
[facility], [place], Emergency Department    [address] [place] [address]    Phone:  [phone]    Fax:  [phone]                                       Anselmo Gresham   MRN: [id number]    Department:  [facility], Emergency Department   Date of Visit:  10/24/2017           After Visit Summary Signature Page     I have received my discharge instructions, and my questions have been answered. I have discussed any challenges I see with this plan with the nurse or doctor.    ..........................................................................................................................................  Patient/Patient Representative Signature      ..........................................................................................................................................  Patient Representative Print Name and Relationship to Patient    ..................................................               ................................................  Date                                            Time    ..........................................................................................................................................  Reviewed by Signature/Title    ...................................................              ..............................................  Date                                                            Time

## 2017-10-24 NOTE — DISCHARGE INSTRUCTIONS
General Neck and Back Pain    Both neck and back pain are usually caused by injury to the muscles or ligaments of the spine. Sometimes the disks that separate each bone of the spine may cause pain by pressing on a nearby nerve. Back and neck pain may appear after a sudden twisting or bending force (such as in a car accident), or sometimes after a simple awkward movement. In either case, muscle spasm is often present and adds to the pain.  Acute neck and back pain usually gets better in 1 to 2 weeks. Pain related to disk disease, arthritis in the spinal joints or spinal stenosis (narrowing of the spinal canal) can become chronic and last for months or years.  Back and neck pain are common problems. Most people feel better in 1 or 2 weeks, and most of the rest in 1 to 2 months. Most people can remain active.  People experience and describe pain differently.    Pain can be sharp, stabbing, shooting, aching, cramping, or burning    Movement, standing, bending, lifting, sitting, or walking may worsen the pain    Pain can be localized to one spot or area, or it can be more generalized    Pain can spread or radiate upwards, downwards, to the front, or go down your arms    Muscle spasm may occur.  Most of the time mechanical problems with the muscles or spine cause the pain. it is usually caused by an injury, whether known or not, to the muscles or ligaments. While illnesses can cause back pain, it is usually not caused by a serious illness. Pain is usually related to physical activity, whether sports, exercise, work, or normal activity. Sometimes it can occur without an identifiable cause. This can happen simply by stretching or moving wrong, without noting pain at the time. Other causes include:    Overexertion, lifting, pushing, pulling incorrectly or too aggressively.    Sudden twisting, bending or stretching from an accident (car or fall), or accidental movement.    Poor posture    Poor conditioning, lack of regular  exercise    Spinal disc disease or arthritis    Stress    Pregnancy, or illness like appendicitis, bladder or kidney infection, pelvic infections   Home care    For neck pain: Use a comfortable pillow that supports the head and keeps the spine in a neutral position. The position of the head should not be tilted forward or backward.    When in bed, try to find a position of comfort. A firm mattress is best. Try lying flat on your back with pillows under your knees. You can also try lying on your side with your knees bent up towards your chest and a pillow between your knees.    At first, do not try to stretch out the sore spots. If there is a strain, it is not like the good soreness you get after exercising without an injury. In this case, stretching may make it worse.    Avoid prolonged sitting, long car rides or travel. This puts more stress on the lower back than standing or walking.    During the first 24 to 72 hours after an injury, apply an ice pack to the painful area for 20 minutes and then remove it for 20 minutes over a period of 60 to 90 minutes or several times a day.     You can alternate ice and heat therapies. Talk with your healthcare provider about the best treatment for your back or neck pain. As a safety precaution, do not use a heating pad at bedtime. Sleeping with a heating pad can lead to skin burns or tissue damage.    Therapeutic massage can help relax the back and neck muscles without stretching them.    Be aware of safe lifting methods and do not lift anything over 15 pounds until all the pain is gone.  Medications  Talk to your healthcare provider before using medicine, especially if you have other medical problems or are taking other medicines.    You may use over-the-counter medicine to control pain, unless another pain medicine was prescribed. If you have chronic conditions like diabetes, liver or kidney disease, stomach ulcers,  gastrointestinal bleeding, or are taking blood thinner  medicines.    Be careful if you are given pain medicines, narcotics, or medicine for muscle spasm. They can cause drowsiness, and can affect your coordination, reflexes, and judgment. Do not drive or operate heavy machinery.  Follow-up care  Follow up with your healthcare provider, or as advised. Physical therapy or further tests may be needed.  If X-rays were taken, you will be notified of any new findings that may affect your care.  Call 911  Seek emergency medical care if any of the following occur:    Trouble breathing    Confusion    Very drowsy or trouble awakening    Fainting or loss of consciousness    Rapid or very slow heart rate    Loss of bowel or bladder control  When to seek medical advice  Call your healthcare provider right away if any of these occur:    Pain becomes worse or spreads into your arms or legs    Weakness, numbness or pain in one or both arms or legs    Numbness in the groin area    Difficulty walking    Fever of 100.4 F (38 C) or higher, or as directed by your healthcare provider  Date Last Reviewed: 7/1/2016 2000-2017 [employer]. [address] [place] [address]. All rights reserved. This information is not intended as a substitute for professional medical care. Always follow your healthcare professional's instructions.          Back Care Tips    Caring for your back  These are things you can do to prevent a recurrence of acute back pain and to reduce symptoms from chronic back pain:    Maintain a healthy weight. If you are overweight, losing weight will help most types of back pain.    Exercise is an important part of recovery from most types of back pain. The muscles behind and in front of the spine support the back. This means strengthening both the back muscles and the abdominal muscles will provide better support for your spine.     Swimming and brisk walking are good overall exercises to improve your fitness level.    Practice safe lifting methods  (below).    Practice good posture when sitting, standing and walking. Avoid prolonged sitting. This puts more stress on the lower back than standing or walking.    Wear quality shoes with sufficient arch support. Foot and ankle alignment can affect back symptoms. Women should avoid wearing high heels.    Therapeutic massage can help relax the back muscles without stretching them.    During the first 24 to 72 hours after an acute injury or flare-up of chronic back pain, apply an ice pack to the painful area for 20 minutes and then remove it for 20 minutes, over a period of 60 to 90 minutes, or several times a day. As a safety precaution, do not use a heating pad at bedtime. Sleeping on a heating pad can lead to skin burns or tissue damage.    You can alternate ice and heat therapies.  Medications  Talk to your healthcare provider before using medicines, especially if you have other medical problems or are taking other medicines.    You may use acetaminophen or ibuprofen to control pain, unless your healthcare provider prescribed other pain medicine. If you have chronic conditions like diabetes, liver or kidney disease, stomach ulcers, or gastrointestinal bleeding, or are taking blood thinners, talk with your healthcare provider before taking any medicines.    Be careful if you are given prescription pain medicines, narcotics, or medicine for muscle spasm. They can cause drowsiness, affect your coordination, reflexes, and judgment. Do not drive or operate heavy machinery while taking these types of medicines. Take prescription pain medicine only as prescribed by your healthcare provider.  Lumbar stretch  Here is a simple stretching exercise that will help relax muscle spasm and keep your back more limber. If exercise makes your back pain worse, don t do it.    Lie on your back with your knees bent and both feet on the ground.    Slowly raise your left knee to your chest as you flatten your lower back against the  floor. Hold for 5 seconds.    Relax and repeat the exercise with your right knee.    Do 10 of these exercises for each leg.  Safe lifting method    Don t bend over at the waist to lift an object off the floor.  Instead, bend your knees and hips in a squat.     Keep your back and head upright    Hold the object close to your body, directly in front of you.    Straighten your legs to lift the object.     Lower the object to the floor in the reverse fashion.    If you must slide something across the floor, push it.  Posture tips  Sitting  Sit in chairs with straight backs or low-back support. Keep your knees lower than your hips, with your feet flat on the floor.  When driving, sit up straight. Adjust the seat forward so you are not leaning toward the steering wheel.  A small pillow or rolled towel behind your lower back may help if you are driving long distances.   Standing  When standing for long periods, shift most of your weight to one leg at a time. Alternate legs every few minutes.   Sleeping  The best way to sleep is on your side with your knees bent. Put a low pillow under your head to support your neck in a neutral spine position. Avoid thick pillows that bend your neck to one side. Put a pillow between your legs to further relax your lower back. If you sleep on your back, put pillows under your knees to support your legs in a slightly flexed position. Use a firm mattress. If your mattress sags, replace it, or use a 1/2-inch plywood board under the mattress to add support.  Follow-up care  Follow up with your healthcare provider, or as advised.  If X-rays, a CT scan or an MRI scan were taken, they will be reviewed by a radiologist. You will be notified of any new findings that may affect your care.  Call 911  Seek emergency medical care if any of the following occur:    Trouble breathing    Confusion    Very drowsy    Fainting or loss of consciousness    Rapid or very slow heart rate    Loss of  bowel or  bladder control  When to seek medical care  Call your healthcare provider if any of the following occur:    Pain becomes worse or spreads to your arms or legs    Weakness or numbness in one or both arms or legs    Numbness in the groin area  Date Last Reviewed: 6/1/2016 2000-2017 [employer]. [address] [place] [address]. All rights reserved. This information is not intended as a substitute for professional medical care. Always follow your healthcare professional's instructions.        [facility]:  [facility] [place] [address]  7 days a week, 7 a.m. to 7 p.m.  Contact  Information: [phone]       The steroid can affect your blood sugars (temporarily) - monitor your sugars closely while taking the steroid.

## 2017-10-25 NOTE — PROGRESS NOTES
Discharge follow up post PCI:10/13/2017    What does the site look like?  Review: Care of wrist / arm site/ femoral site  It is normal to have soreness and or bruising at the puncture site and mild tingling in your hand for up to 3 days. The site should be flat and dry.  Patient stated he did not have time to talk as he was at work.      If you start bleeding from the site in your arm:  Sit down and press firmly on the site with your fingers for 10 minutes. Call your doctor as soon as you can.  If the bleeding stops, sit still and keep your wrist straight for 2 hours.  Do NOT take a bath, or use a hot tub or pool for at least 3 days. You may shower.   Patient stated he did not have time to talk as he  was at work.  Call your doctor if:    You have a large or growing hard lump around the site.    The site is red, swollen, hot or tender.    Blood or fluid is draining from the site.    You have chills or a fever greater than 101 F (38 C).    Your leg or arm feels numb or cool.    You have hives, a rash or unusual itching.  Not discussed    Are you having any pain? Not discussed  How is your activity tolerance?    For 2 days, do NOT have sex or do any heavy exercise.  Do you have someone at home to assist you with your daily activities?    Not discussed     Review Medications: Dual antiplatelet therapy  If you have started taking Brilinta do not stop taking it until you talk to your heart doctor (cardiologist). Dual antiplatelet therapy for one year.  If you are on metformin (Glucophage), do not restart it until you have blood tests (within 2 to 3 days after discharge). When your doctor tells you it is safe, you may restart the metformin.  If you have stopped any other medicines, check with your nurse or provider about when to restart them.  Stated he doesn't know any of his medications without his list so he does not want to talk about it.    Have you scheduled with Cardiac Rehab? Didn't ask     FOLLOW UP  Do you have  a follow up appointment with your provider?  Not discussed    What other discharge instructions do you have? Yes, suppose to have a cardiac MRI because he couldn't get one in the hospital because the machine broke down.     Are you to get labs, procedures or tests before you see your provider? no      You are scheduled to see; refuses to see a NP, it's not worth his time.  Only wants to see the cardiologist who discharged him but he doesn't remember his name       CONTACT INFORMATION  Please feel free to call us with any other questions or symptoms that are concerning for you at [phone] if it is after 4:30 in the afternoon, or a weekend please call [phone] and ask for the on call specialist.  We want to do everything we can to help prevent you needing to return to the ED, so please do not hesitate to call us.

## 2017-11-01 ENCOUNTER — PRE VISIT (OUTPATIENT)
Dept: CARDIOLOGY | Facility: CLINIC | Age: 52
End: 2017-11-01

## 2017-11-01 NOTE — TELEPHONE ENCOUNTER
Pre visit nursing summary    Imaging:  CT Chest, Abdomen, and Pelvis 10/13/2017:  1. No acute pathology of the aorta, including no dissection, penetrating ulcer, or intramural hematoma.  2. Cardiomegaly.  3. Scattered bilateral pulmonary nodules, which are unchanged from 6/5/2006, and are statistically benign by Fleischer Society criteria.     Echocardiogram 10/13/2017:  Technically difficult study.Extremely poor acoustic windows.  Left ventricular size is normal.  Biplane LV EF 44%.  Large LAD territory akinesis.  The inferior vena cava is normal.  Previous study not available for comparison.     Procedures:  1. Coronary Angiogram 10/13/2017:  1. Both coronary arteries arise from their respective cusps.  2. Left dominant.  3. LM is without angiographic evidence of disease.   4. LAD supplies the entire apex (type 3) and has acute thrombotic occlusion in the mid vessel just distal to the 1st small diagonal and septal branches.    5. LCX is a very large and dominant vessel and supplies the majority of the inferior wall.  It gives rise to large OM1 and small OM2 and OM3 vessels.  The AV LCx has mild luminal irregularities and gives off a L PDA.  The large OM1 is free of disease.        6. RCA is small and non-dominant and gives off small marginal branches.  It has mild luminal irregularities.       PERCUTANEOUS CORONARY INTERVENTION:  1. LAD Lesion:  A 6Fr XB3.5 guide catheter was positioned at the ostium of the LM.  Heparin was administered to achieve a goal ACT > 250 sec.    A Po Blue wire was advanced across the midLAD lesion and positioned in the distal LAD.  A Pronto aspiration thrombectomy catheter was inserted and advanced back and forth across midLAD lesion 5 times.  After it was removed, a 4.0x12mm Synergy LADONNA was placed in the midLAD.  There was evidence that we didn't cover the most proximal area of the ruptured plaque and a 2nd 4.0x16mm LADONNA was placed more proximally and ballooned with a 4.5x12mm NC  balloon.    Final angiography showed no evidence of perforation or dissection with residual stenosis of 0% and CYNTHIA 3 flow.  No complications.     FEMORAL ANGIOGRAPHY  1. The right external iliac and common femoral arteries showed no evidence of significant peripheral artery disease.  Access site was confirmed in the common femoral artery.      ARTERIOTOMY CLOSURE:  1. A 6Fr Angioseal device was used for arteriotomy closure.      COMPLICATIONS:  1. None      SUMMARY:   1. Anterior ST elevation myocardial infarction 2/2 ruptured plaque.  2. Severe 1 vessel coronary artery disease (LAD).  3. Successful deployment of a 4.0x12mm Synergy LADONNA and 4.0x16mm LADONNA to the mid LAD.   5. Arterial access site closed with closure device

## 2017-11-02 ENCOUNTER — OFFICE VISIT (OUTPATIENT)
Dept: CARDIOLOGY | Facility: CLINIC | Age: 52
End: 2017-11-02
Attending: INTERNAL MEDICINE
Payer: COMMERCIAL

## 2017-11-02 VITALS
HEIGHT: 71 IN | HEART RATE: 97 BPM | WEIGHT: 274.1 LBS | BODY MASS INDEX: 38.37 KG/M2 | OXYGEN SATURATION: 96 % | DIASTOLIC BLOOD PRESSURE: 75 MMHG | SYSTOLIC BLOOD PRESSURE: 110 MMHG

## 2017-11-02 DIAGNOSIS — I21.01 ST ELEVATION MYOCARDIAL INFARCTION INVOLVING LEFT MAIN CORONARY ARTERY (H): Primary | ICD-10-CM

## 2017-11-02 PROCEDURE — 99213 OFFICE O/P EST LOW 20 MIN: CPT | Mod: ZF

## 2017-11-02 PROCEDURE — 99214 OFFICE O/P EST MOD 30 MIN: CPT | Mod: ZP | Performed by: INTERNAL MEDICINE

## 2017-11-02 ASSESSMENT — PAIN SCALES - GENERAL: PAINLEVEL: NO PAIN (0)

## 2017-11-02 NOTE — LETTER
11/2/2017      RE: Anselmo Gresham  [address]  [facility] [address]       Dear Colleague,    Thank you for the opportunity to participate in the care of your patient, Anselmo Gresham, at the [facility] at [facility]. Please see a copy of my visit note below.        CARDIOVASCULAR DIVISION    CARDIOLOGY CLINIC NOTE    PRIMARY CARDIOLOGIST: Dr. Connors      PERTINENT CLINICAL HISTORY:     Anselmo Gresham is a very pleasant 52 year old male with hx prior tobacco use, HTN, HLD, IDDM who had anterior STEMI on 10/13/2017.  He had 2 LADONNA to mLAD and TTE showed LVEF 44% with anterior wall motion abnormality.  He is on good medical management and has been compliant with his aspirin, ticagrelor, high intensity statin, ACE-I and beta blocker.  Since his LADONNA, he has felt well.  He quit smoking and he has been very conscientious of his nutritional intake where he now eats baked foods that are low fat and low salt.  He did go to cardiac rehab once and felt that he could do exercises on his own so he has been walking 3-5x/week on his own for about 30 minutes.  He understands he needs to take better care of himself and is willing to put in the work since he let himself go over last few years due to a hard divorce.    He denies any further chest/shoulder discomfort, lightheadedness, dizziness, ROSEN, SOB.      He did go to ED about a week ago for back pain/spinal stenosis.  He was give prednisone pack and was scheduled for an epidural injection this coming week.  He did notice that his blood sugars increased to the 300s when he was on short steroid burst.         PAST MEDICAL HISTORY:     Past Medical History:   Diagnosis Date     Diabetes mellitus (H)      Dyspepsia and other specified disorders of function of stomach      Myocardial infarction     10/12/2017     Obesity      Unspecified sleep apnea         PAST SURGICAL HISTORY:     Past Surgical History:   Procedure Laterality Date      C ANESTH,REPAIR LO HERNIA VENTR/INCIS       CARDIAC SURGERY      2 stents placed     HC UGI ENDOSCOPY W EUS  4/2/2012    Procedure:COMBINED ENDOSCOPIC ULTRASOUND, ESOPHAGOSCOPY, GASTROSCOPY, DUODENOSCOPY (EGD); Surgeon:RACHEL DIAZ; Location:[facility]     HC UGI ENDOSCOPY W EUS  12/17/2013    Procedure: COMBINED ENDOSCOPIC ULTRASOUND, ESOPHAGOSCOPY, GASTROSCOPY, DUODENOSCOPY (EGD);  Surgeon: Rachel Diaz MD;  Location: [facility]     HEMORRHOIDECTOMY,EXTERNAL       LAPAROSCOPIC CHOLECYSTECTOMY  4/3/2012    Procedure:LAPAROSCOPIC CHOLECYSTECTOMY; Laparoscopic Cholecystectomy ; Surgeon:LESIA RODRIGUEZ; Location:[facility]        CURRENT MEDICATIONS:     Current Outpatient Prescriptions   Medication Sig Dispense Refill     methylPREDNISolone (MEDROL DOSEPAK) 4 MG tablet Follow package instructions 21 tablet 0     cyclobenzaprine (FLEXERIL) 10 MG tablet Take 1 tablet (10 mg) by mouth 3 times daily as needed for muscle spasms 90 tablet 1     aspirin EC 81 MG EC tablet Take 1 tablet (81 mg) by mouth daily 90 tablet 3     carvedilol (COREG) 12.5 MG tablet Take 1 tablet (12.5 mg) by mouth 2 times daily 120 tablet 0     lisinopril (PRINIVIL/ZESTRIL) 5 MG tablet Take 3 tablets (15 mg) by mouth daily 90 tablet 0     nicotine (NICODERM CQ) 14 MG/24HR 24 hr patch Place 1 patch onto the skin daily 30 patch 0     ticagrelor (BRILINTA) 90 MG tablet Take 1 tablet (90 mg) by mouth 2 times daily 180 tablet 3     atorvastatin (LIPITOR) 80 MG tablet Take 1 tablet (80 mg) by mouth daily 90 tablet 3     GLIPIZIDE PO Take by mouth 2 times daily (before meals)       prochlorperazine (COMPAZINE) 25 MG suppository Place 1 suppository (25 mg) rectally every 12 hours as needed for nausea 20 suppository 1     metaxalone (SKELAXIN) 800 MG tablet Take 1 tablet (800 mg) by mouth 3 times daily as needed for moderate pain 30 tablet 1     oxyCODONE (ROXICODONE) 5 MG immediate release tablet Take 1 tablet (5 mg) by mouth every 6 hours as  "needed 20 tablet 0     METFORMIN HCL PO Take 1,000 mg by mouth 2 times daily (with meals).          ALLERGIES:   No Known Allergies     FAMILY HISTORY:     Family History   Problem Relation Age of Onset     DIABETES Father      CANCER Father      lung and liver        SOCIAL HISTORY:     Social History     Social History     Marital status: Single     Spouse name: N/A     Number of children: N/A     Years of education: N/A     Occupational History     manager      Social History Main Topics     Smoking status: Former Smoker     Years: 0.00     Types: Cigarettes     Quit date: 10/11/2017     Smokeless tobacco: Never Used     Alcohol use Yes      Comment: States occ     Drug use: No     Sexual activity: Yes     Partners: Female     Other Topics Concern     Not on file     Social History Narrative        REVIEW OF SYSTEMS:     Constitutional: No fevers or chills  Skin: No new rash or itching  Eyes: No acute change in vision  Ears/Nose/Throat: No purulent rhinorrhea, new hearing loss, or new vertigo  Respiratory: No cough or hemoptysis  Cardiovascular: See HPI  Gastrointestinal: No change in appetite, vomiting, hematemesis or diarrhea  Genitourinary: No dysuria or hematuria  Musculoskeletal: No new back pain, neck pain or muscle pain  Neurologic: No new headaches, focal weakness or behavior changes  Psychiatric: No hallucinations, excessive alcohol consumption or illegal drug usage  Hematologic/Lymphatic/Immunologic: No bleeding, chills, fever, night sweats or weight loss  Endocrine: No new cold intolerance, heat intolerance, polyphagia, polydipsia or polyuria      PHYSICAL EXAMINATION:     /75 (BP Location: Left arm, Patient Position: Chair, Cuff Size: Adult Large)  Pulse 97  Ht 1.803 m (5' 11\")  Wt 124.3 kg (274 lb 1.6 oz)  SpO2 96%  BMI 38.23 kg/m2    GENERAL: No acute distress.  HEENT: EOMI. Sclerae white, not injected. Nares clear. Pharynx without erythema or exudate.   Neck: No adenopathy. No " thyromegaly. Symmetrical.   Heart: Regular rate and rhythm. No murmurs, rubs, gallops. Normal S1S2  Lungs: Clear to auscultation. No ronchi, wheezes, rales.   Abdomen: Soft, nontender, nondistended. Bowel sounds present.  Extremities: No clubbing, cyanosis, or edema.   Neurologic: Alert and oriented to person/place/time, normal speech and affect. No focal deficits.  Skin: No petechiae, purpura or rash.     LABORATORY DATA:     LIPID RESULTS:  Lab Results   Component Value Date    CHOL 133 10/14/2017    HDL 35 (L) 10/14/2017    LDL 38 10/14/2017    TRIG 300 (H) 10/14/2017    CHOLHDLRATIO 3.9 03/31/2012       LIVER ENZYME RESULTS:  Lab Results   Component Value Date    AST 21 08/05/2016    ALT 38 08/05/2016       CBC RESULTS:  Lab Results   Component Value Date    WBC 11.6 (H) 10/14/2017    RBC 4.76 10/14/2017    HGB 15.3 10/14/2017    HCT 45.6 10/14/2017    MCV 96 10/14/2017    MCH 32.1 10/14/2017    MCHC 33.6 10/14/2017    RDW 13.1 10/14/2017     10/14/2017       BMP RESULTS:  Lab Results   Component Value Date     10/14/2017    POTASSIUM 3.8 10/14/2017    CHLORIDE 105 10/14/2017    CO2 21 10/14/2017    ANIONGAP 14 10/14/2017     (H) 10/14/2017    BUN 16 10/14/2017    CR 0.61 (L) 10/14/2017    GFRESTIMATED >90 10/14/2017    GFRESTBLACK >90 10/14/2017    MACHO 8.6 10/14/2017        A1C RESULTS:  Lab Results   Component Value Date    A1C 8.5 (H) 10/14/2017       INR RESULTS:  Lab Results   Component Value Date    INR 1.05 04/03/2012    INR 0.97 05/07/2007          PROCEDURES & FURTHER ASSESSMENTS:     ECG: post LAD LADONNA showed SR with resolution of ST elevation changes    Echocardiogram: 10/13/2017: Technically difficult study.Extremely poor acoustic windows.  Left ventricular size is normal.  Biplane LV EF 44%.  Large LAD territory akinesis.  The inferior vena cava is normal.  Previous study not available for comparison.         CLINICAL IMPRESSION:     Anselmo Gresham is a 52 year old male with hx  tobacco use, HTN, HLD, prior tobacco use, IDDM who presented on 10/13/2017 with jaw pain and found to have anterior STEMI s/p mLAD LADONNA x2 and found to have mild ischemic cardiomyopathy with LVEF 44%.   He is doing very well except for spinal stenosis and scheduled to have an epidural injection next week for spinal stenosis.     1. CAD- doing well and motivated   Will continue high intensity statin and dual antiplatelet medications with aspirin and ticagrelor  Continue ACE-I and beta blocker  Encouraged to exercise more and monitor his weight  Commended on having quit smoking  Will get repeat TTE to assess LVEF  Consider neurohormonal blockade if EF is still reduced  Repeat lipid panel in 6 months  Return to clinic in 6 months or sooner if needed    2. Spinal stenosis-Have instructed him that he's on dual antiplatelet (DAPT) and may be at higher risk of epidural complication- he should talk to MD before receiving his epidural injection and cannot stop DAPT for at least 6-12 months given recent STEMI      Patient and staffed with Dr Connors.    Nir Perla  Cardiology fellow  Pager [phone]        CC  Patient Care Team:  Sulma Espinoza MD as PCP - General (Family Practice)  SULMA ESPINOZA    Patient seen and examined with Cardiovascular fellow and agree with the assessment and plan described above.     Rishi Connors M.D.  Interventional Cardiology  [facility]

## 2017-11-02 NOTE — NURSING NOTE
Chief Complaint   Patient presents with     Follow Up For     STEMI f/u     Vitals were taken and medications were reconciled.  Avery Dowell, ASTON  3:32 PM

## 2017-11-02 NOTE — PATIENT INSTRUCTIONS
You were seen today in the Cardiovascular Clinic at the [facility].      Cardiology Providers you saw during your visit:  Dr. Connors    Recommendations:  No changes at this time!     Follow-up:  In 6 months with an ECHO and fasting lipid labs prior to seeing Dr. Connors    Thank you for your visit today!       Please call if you have any questions or concerns.  Cardiology Care Coordinator  Renetta Gage RN    For scheduling needs [phone] option 1 and the option 1 again.  Nursing questions: [phone] option 1 then chose option 3 for the triage nurse.  For emergencies call [phone].

## 2017-11-02 NOTE — MR AVS SNAPSHOT
After Visit Summary   11/2/2017    Anselmo Gresham    MRN: [id number]           Patient Information     Date Of Birth          1965        Visit Information        Provider Department      11/2/2017 4:00 PM Rishi Connors MD [facility]        Today's Diagnoses     ST elevation myocardial infarction (STEMI) (H)    -  1      Care Instructions    You were seen today in the Cardiovascular Clinic at the [facility].      Cardiology Providers you saw during your visit:  Dr. Connors    Recommendations:  No changes at this time!     Follow-up:  In 6 months with an ECHO and fasting lipid labs prior to seeing Dr. Connors    Thank you for your visit today!       Please call if you have any questions or concerns.  Cardiology Care Coordinator  Renetta Gage RN    For scheduling needs [phone] option 1 and the option 1 again.  Nursing questions: [phone] option 1 then chose option 3 for the triage nurse.  For emergencies call [phone].                  Follow-ups after your visit        Follow-up notes from your care team     Return in about 6 months (around 5/2/2018), or if symptoms worsen or fail to improve, for Dr. Connors, NSTEMI.      Your next 10 appointments already scheduled     May 03, 2018  7:00 AM CDT   Lab with  LAB   [facility] ([facility])    [address] [place] [id number] [phone]            May 03, 2018  5:00 PM CDT   Ech Complete with [address] Echo ([facility])    [address] [place] [id number] [phone]           1. Please bring or wear a comfortable two-piece outfit. 2. You may eat, drink and take your normal medicines. 3. For any questions that cannot be answered, please contact the ordering physician              Future tests that were ordered for you today     Open Future Orders        Priority Expected Expires Ordered     "Echocardiogram Complete Routine 2018            Who to contact     If you have questions or need follow up information about today's clinic visit or your schedule please contact [facility] directly at [phone].  Normal or non-critical lab and imaging results will be communicated to you by [employer], letter or phone within 4 business days after the clinic has received the results. If you do not hear from us within 7 days, please contact the clinic through [employer] or phone. If you have a critical or abnormal lab result, we will notify you by phone as soon as possible.  Submit refill requests through [employer] or call your pharmacy and they will forward the refill request to us. Please allow 3 business days for your refill to be completed.          Additional Information About Your Visit        [employer] Information     [employer] lets you send messages to your doctor, view your test results, renew your prescriptions, schedule appointments and more. To sign up, go to [email or web address] . Click on \"Log in\" on the left side of the screen, which will take you to the Welcome page. Then click on \"Sign up Now\" on the right side of the page.     You will be asked to enter the access code listed below, as well as some personal information. Please follow the directions to create your username and password.     Your access code is: [email or web address]  Expires: 2018  1:39 PM     Your access code will  in 90 days. If you need help or a new code, please call your [facility] clinic or [phone].        Care EveryWhere ID     This is your Care EveryWhere ID. This could be used by other organizations to access your [facility] medical records  [id number]        Your Vitals Were     Pulse Height Pulse Oximetry BMI (Body Mass Index)          97 1.803 m (5' 11\") 96% 38.23 kg/m2         Blood Pressure from Last 3 Encounters:   17 110/75   10/24/17 (!) 132/95   10/14/17 112/78    Weight from Last " 3 Encounters:   11/02/17 124.3 kg (274 lb 1.6 oz)   10/24/17 122.5 kg (270 lb)   10/14/17 124.5 kg (274 lb 8 oz)               Primary Care Provider Office Phone # Fax #    Sulma Tracey Sharma [phone]       [facility] [address]  [facility] [address]        Equal Access to Services     ARTEMIO DURON : Hadii aad ku hadasho Soomaali, waaxda luqadaha, qaybta kaalmada adeegyada, waxay idiin hayaan adeeg kharash la'aan . So [facility] [phone].    ATENCIÓN: Si luz elenala espdrea, tiene a beltran disposición servicios gratuitos de asistencia lingüística. [facility] [phone].    We comply with applicable federal civil rights laws and [place] laws. We do not discriminate on the basis of race, color, national origin, age, disability, sex, sexual orientation, or gender identity.            Thank you!     Thank you for choosing [facility]  for your care. Our goal is always to provide you with excellent care. Hearing back from our patients is one way we can continue to improve our services. Please take a few minutes to complete the written survey that you may receive in the mail after your visit with us. Thank you!             Your Updated Medication List - Protect others around you: Learn how to safely use, store and throw away your medicines at www.disposemymeds.org.          This list is accurate as of: 11/2/17  4:50 PM.  Always use your most recent med list.                   Brand Name Dispense Instructions for use Diagnosis    aspirin 81 MG EC tablet     90 tablet    Take 1 tablet (81 mg) by mouth daily    ST elevation myocardial infarction (STEMI), unspecified artery (H)       atorvastatin 80 MG tablet    LIPITOR    90 tablet    Take 1 tablet (80 mg) by mouth daily    ST elevation myocardial infarction (STEMI), unspecified artery (H)       carvedilol 12.5 MG tablet    COREG    120 tablet    Take 1 tablet (12.5 mg) by mouth 2 times daily    ST elevation myocardial  infarction (STEMI), unspecified artery (H)       cyclobenzaprine 10 MG tablet    FLEXERIL    90 tablet    Take 1 tablet (10 mg) by mouth 3 times daily as needed for muscle spasms        GLIPIZIDE PO      Take by mouth 2 times daily (before meals)        lisinopril 5 MG tablet    PRINIVIL/ZESTRIL    90 tablet    Take 3 tablets (15 mg) by mouth daily    ST elevation myocardial infarction (STEMI), unspecified artery (H)       metaxalone 800 MG tablet    SKELAXIN    30 tablet    Take 1 tablet (800 mg) by mouth 3 times daily as needed for moderate pain        METFORMIN HCL PO      Take 1,000 mg by mouth 2 times daily (with meals).        methylPREDNISolone 4 MG tablet    MEDROL DOSEPAK    21 tablet    Follow package instructions        nicotine 14 MG/24HR 24 hr patch    NICODERM CQ    30 patch    Place 1 patch onto the skin daily    ST elevation myocardial infarction (STEMI), unspecified artery (H), Tobacco abuse       oxyCODONE IR 5 MG tablet    ROXICODONE    20 tablet    Take 1 tablet (5 mg) by mouth every 6 hours as needed    Acute pancreatitis       prochlorperazine 25 MG Suppository    COMPAZINE    20 suppository    Place 1 suppository (25 mg) rectally every 12 hours as needed for nausea        ticagrelor 90 MG tablet    BRILINTA    180 tablet    Take 1 tablet (90 mg) by mouth 2 times daily    ST elevation myocardial infarction (STEMI), unspecified artery (H)

## 2017-11-02 NOTE — PROGRESS NOTES
CARDIOVASCULAR DIVISION    CARDIOLOGY CLINIC NOTE    PRIMARY CARDIOLOGIST: Dr. Connors      PERTINENT CLINICAL HISTORY:     Anselmo Gresham is a very pleasant 52 year old male with hx prior tobacco use, HTN, HLD, IDDM who had anterior STEMI on 10/13/2017.  He had 2 LADONNA to mLAD and TTE showed LVEF 44% with anterior wall motion abnormality.  He is on good medical management and has been compliant with his aspirin, ticagrelor, high intensity statin, ACE-I and beta blocker.  Since his LADONNA, he has felt well.  He quit smoking and he has been very conscientious of his nutritional intake where he now eats baked foods that are low fat and low salt.  He did go to cardiac rehab once and felt that he could do exercises on his own so he has been walking 3-5x/week on his own for about 30 minutes.  He understands he needs to take better care of himself and is willing to put in the work since he let himself go over last few years due to a hard divorce.    He denies any further chest/shoulder discomfort, lightheadedness, dizziness, ROSEN, SOB.      He did go to ED about a week ago for back pain/spinal stenosis.  He was give prednisone pack and was scheduled for an epidural injection this coming week.  He did notice that his blood sugars increased to the 300s when he was on short steroid burst.         PAST MEDICAL HISTORY:     Past Medical History:   Diagnosis Date     Diabetes mellitus (H)      Dyspepsia and other specified disorders of function of stomach      Myocardial infarction     10/12/2017     Obesity      Unspecified sleep apnea         PAST SURGICAL HISTORY:     Past Surgical History:   Procedure Laterality Date     C ANESTH,REPAIR LO HERNIA VENTR/INCIS       CARDIAC SURGERY      2 stents placed     [facility] UGI ENDOSCOPY W EUS  4/2/2012    Procedure:COMBINED ENDOSCOPIC ULTRASOUND, ESOPHAGOSCOPY, GASTROSCOPY, DUODENOSCOPY (EGD); Surgeon:ALANA LEGGETT; Location: GI     [facility] UGI ENDOSCOPY W EUS  12/17/2013    Procedure:  COMBINED ENDOSCOPIC ULTRASOUND, ESOPHAGOSCOPY, GASTROSCOPY, DUODENOSCOPY (EGD);  Surgeon: Rachel Diaz MD;  Location: [facility]     HEMORRHOIDECTOMY,EXTERNAL       LAPAROSCOPIC CHOLECYSTECTOMY  4/3/2012    Procedure:LAPAROSCOPIC CHOLECYSTECTOMY; Laparoscopic Cholecystectomy ; Surgeon:LESIA RODRIGUEZ; Location: OR        CURRENT MEDICATIONS:     Current Outpatient Prescriptions   Medication Sig Dispense Refill     methylPREDNISolone (MEDROL DOSEPAK) 4 MG tablet Follow package instructions 21 tablet 0     cyclobenzaprine (FLEXERIL) 10 MG tablet Take 1 tablet (10 mg) by mouth 3 times daily as needed for muscle spasms 90 tablet 1     aspirin EC 81 MG EC tablet Take 1 tablet (81 mg) by mouth daily 90 tablet 3     carvedilol (COREG) 12.5 MG tablet Take 1 tablet (12.5 mg) by mouth 2 times daily 120 tablet 0     lisinopril (PRINIVIL/ZESTRIL) 5 MG tablet Take 3 tablets (15 mg) by mouth daily 90 tablet 0     nicotine (NICODERM CQ) 14 MG/24HR 24 hr patch Place 1 patch onto the skin daily 30 patch 0     ticagrelor (BRILINTA) 90 MG tablet Take 1 tablet (90 mg) by mouth 2 times daily 180 tablet 3     atorvastatin (LIPITOR) 80 MG tablet Take 1 tablet (80 mg) by mouth daily 90 tablet 3     GLIPIZIDE PO Take by mouth 2 times daily (before meals)       prochlorperazine (COMPAZINE) 25 MG suppository Place 1 suppository (25 mg) rectally every 12 hours as needed for nausea 20 suppository 1     metaxalone (SKELAXIN) 800 MG tablet Take 1 tablet (800 mg) by mouth 3 times daily as needed for moderate pain 30 tablet 1     oxyCODONE (ROXICODONE) 5 MG immediate release tablet Take 1 tablet (5 mg) by mouth every 6 hours as needed 20 tablet 0     METFORMIN HCL PO Take 1,000 mg by mouth 2 times daily (with meals).          ALLERGIES:   No Known Allergies     FAMILY HISTORY:     Family History   Problem Relation Age of Onset     DIABETES Father      CANCER Father      lung and liver        SOCIAL HISTORY:     Social History  "    Social History     Marital status: Single     Spouse name: N/A     Number of children: N/A     Years of education: N/A     Occupational History     manager      Social History Main Topics     Smoking status: Former Smoker     Years: 0.00     Types: Cigarettes     Quit date: 10/11/2017     Smokeless tobacco: Never Used     Alcohol use Yes      Comment: States occ     Drug use: No     Sexual activity: Yes     Partners: Female     Other Topics Concern     Not on file     Social History Narrative        REVIEW OF SYSTEMS:     Constitutional: No fevers or chills  Skin: No new rash or itching  Eyes: No acute change in vision  Ears/Nose/Throat: No purulent rhinorrhea, new hearing loss, or new vertigo  Respiratory: No cough or hemoptysis  Cardiovascular: See HPI  Gastrointestinal: No change in appetite, vomiting, hematemesis or diarrhea  Genitourinary: No dysuria or hematuria  Musculoskeletal: No new back pain, neck pain or muscle pain  Neurologic: No new headaches, focal weakness or behavior changes  Psychiatric: No hallucinations, excessive alcohol consumption or illegal drug usage  Hematologic/Lymphatic/Immunologic: No bleeding, chills, fever, night sweats or weight loss  Endocrine: No new cold intolerance, heat intolerance, polyphagia, polydipsia or polyuria      PHYSICAL EXAMINATION:     /75 (BP Location: Left arm, Patient Position: Chair, Cuff Size: Adult Large)  Pulse 97  Ht 1.803 m (5' 11\")  Wt 124.3 kg (274 lb 1.6 oz)  SpO2 96%  BMI 38.23 kg/m2    GENERAL: No acute distress.  HEENT: EOMI. Sclerae white, not injected. Nares clear. Pharynx without erythema or exudate.   Neck: No adenopathy. No thyromegaly. Symmetrical.   Heart: Regular rate and rhythm. No murmurs, rubs, gallops. Normal S1S2  Lungs: Clear to auscultation. No ronchi, wheezes, rales.   Abdomen: Soft, nontender, nondistended. Bowel sounds present.  Extremities: No clubbing, cyanosis, or edema.   Neurologic: Alert and oriented to " person/place/time, normal speech and affect. No focal deficits.  Skin: No petechiae, purpura or rash.     LABORATORY DATA:     LIPID RESULTS:  Lab Results   Component Value Date    CHOL 133 10/14/2017    HDL 35 (L) 10/14/2017    LDL 38 10/14/2017    TRIG 300 (H) 10/14/2017    CHOLHDLRATIO 3.9 03/31/2012       LIVER ENZYME RESULTS:  Lab Results   Component Value Date    AST 21 08/05/2016    ALT 38 08/05/2016       CBC RESULTS:  Lab Results   Component Value Date    WBC 11.6 (H) 10/14/2017    RBC 4.76 10/14/2017    HGB 15.3 10/14/2017    HCT 45.6 10/14/2017    MCV 96 10/14/2017    MCH 32.1 10/14/2017    MCHC 33.6 10/14/2017    RDW 13.1 10/14/2017     10/14/2017       BMP RESULTS:  Lab Results   Component Value Date     10/14/2017    POTASSIUM 3.8 10/14/2017    CHLORIDE 105 10/14/2017    CO2 21 10/14/2017    ANIONGAP 14 10/14/2017     (H) 10/14/2017    BUN 16 10/14/2017    CR 0.61 (L) 10/14/2017    GFRESTIMATED >90 10/14/2017    GFRESTBLACK >90 10/14/2017    MACHO 8.6 10/14/2017        A1C RESULTS:  Lab Results   Component Value Date    A1C 8.5 (H) 10/14/2017       INR RESULTS:  Lab Results   Component Value Date    INR 1.05 04/03/2012    INR 0.97 05/07/2007          PROCEDURES & FURTHER ASSESSMENTS:     ECG: post LAD LADONNA showed SR with resolution of ST elevation changes    Echocardiogram: 10/13/2017: Technically difficult study.Extremely poor acoustic windows.  Left ventricular size is normal.  Biplane LV EF 44%.  Large LAD territory akinesis.  The inferior vena cava is normal.  Previous study not available for comparison.         CLINICAL IMPRESSION:     Anselmo Gresham is a 52 year old male with hx tobacco use, HTN, HLD, prior tobacco use, IDDM who presented on 10/13/2017 with jaw pain and found to have anterior STEMI s/p mLAD LADONNA x2 and found to have mild ischemic cardiomyopathy with LVEF 44%.   He is doing very well except for spinal stenosis and scheduled to have an epidural injection next week for  spinal stenosis.     1. CAD- doing well and motivated   Will continue high intensity statin and dual antiplatelet medications with aspirin and ticagrelor  Continue ACE-I and beta blocker  Encouraged to exercise more and monitor his weight  Commended on having quit smoking  Will get repeat TTE to assess LVEF  Consider neurohormonal blockade if EF is still reduced  Repeat lipid panel in 6 months  Return to clinic in 6 months or sooner if needed    2. Spinal stenosis-Have instructed him that he's on dual antiplatelet (DAPT) and may be at higher risk of epidural complication- he should talk to MD before receiving his epidural injection and cannot stop DAPT for at least 6-12 months given recent STEMI      Patient and staffed with Dr Connors.    Nir Perla  Cardiology fellow  Pager [phone]        CC  Patient Care Team:  Sulma Espinoza MD as PCP - General (Family Practice)  SULMA ESPINOZA    Patient seen and examined with Cardiovascular fellow and agree with the assessment and plan described above.     Rishi Connors M.D.  Interventional Cardiology  [facility]

## 2017-11-03 NOTE — NURSING NOTE
Chief Complaint   Patient presents with     Follow Up For     STEMI f/u     Cardiology Providers you saw during your visit:  Dr. Connors    Recommendations:  No changes at this time!     Follow-up:  In 6 months with an ECHO and fasting lipid labs prior to seeing Dr. Connors    Med Reconcile: Reviewed and verified all current medications with the patient. The updated medication list was printed and given to the patient.  Return Appointment: Patient given instructions regarding scheduling next clinic visit. Patient demonstrated understanding of this information and agreed to call with further questions or concerns.  Patient stated he understood all health information given and agreed to call with further questions or concerns.

## 2017-11-09 ENCOUNTER — CARE COORDINATION (OUTPATIENT)
Dept: CARDIOLOGY | Facility: CLINIC | Age: 52
End: 2017-11-09

## 2017-11-09 DIAGNOSIS — I21.3 ST ELEVATION MYOCARDIAL INFARCTION (STEMI), UNSPECIFIED ARTERY (H): ICD-10-CM

## 2017-11-09 DIAGNOSIS — I21.3 ST ELEVATION MYOCARDIAL INFARCTION (STEMI) (H): Primary | ICD-10-CM

## 2017-11-09 RX ORDER — CARVEDILOL 12.5 MG/1
12.5 TABLET ORAL 2 TIMES DAILY
Qty: 180 TABLET | Refills: 3 | Status: SHIPPED | OUTPATIENT
Start: 2017-11-09 | End: 2018-11-01

## 2017-11-09 RX ORDER — LISINOPRIL 5 MG/1
15 TABLET ORAL DAILY
Qty: 90 TABLET | Refills: 3 | Status: SHIPPED | OUTPATIENT
Start: 2017-11-09 | End: 2018-03-13

## 2017-11-09 RX ORDER — ATORVASTATIN CALCIUM 80 MG/1
80 TABLET, FILM COATED ORAL DAILY
Qty: 90 TABLET | Refills: 3 | Status: SHIPPED | OUTPATIENT
Start: 2017-11-09 | End: 2019-10-31

## 2018-03-13 DIAGNOSIS — I21.3 ST ELEVATION MYOCARDIAL INFARCTION (STEMI), UNSPECIFIED ARTERY (H): ICD-10-CM

## 2018-03-14 RX ORDER — LISINOPRIL 5 MG/1
15 TABLET ORAL DAILY
Qty: 90 TABLET | Refills: 11 | Status: SHIPPED | OUTPATIENT
Start: 2018-03-14

## 2018-05-04 ENCOUNTER — TELEPHONE (OUTPATIENT)
Dept: PHARMACY | Facility: OTHER | Age: 53
End: 2018-05-04

## 2018-05-04 NOTE — TELEPHONE ENCOUNTER
MTM referral from: [facility] recruitment    MTM referral outreach attempt #3 on May 4, 2018 at 2:19 PM      Outcome: No Answer    Zeynep Jones, MTM Coordinator Intern

## 2018-06-13 ENCOUNTER — RADIANT APPOINTMENT (OUTPATIENT)
Dept: CARDIOLOGY | Facility: CLINIC | Age: 53
End: 2018-06-13
Payer: COMMERCIAL

## 2018-06-13 ENCOUNTER — OFFICE VISIT (OUTPATIENT)
Dept: CARDIOLOGY | Facility: CLINIC | Age: 53
End: 2018-06-13
Attending: NURSE PRACTITIONER
Payer: COMMERCIAL

## 2018-06-13 VITALS
OXYGEN SATURATION: 95 % | DIASTOLIC BLOOD PRESSURE: 89 MMHG | HEART RATE: 83 BPM | HEIGHT: 71 IN | SYSTOLIC BLOOD PRESSURE: 125 MMHG | BODY MASS INDEX: 39.42 KG/M2 | WEIGHT: 281.6 LBS

## 2018-06-13 DIAGNOSIS — I10 BENIGN ESSENTIAL HYPERTENSION: ICD-10-CM

## 2018-06-13 DIAGNOSIS — I21.01 ST ELEVATION MYOCARDIAL INFARCTION INVOLVING LEFT MAIN CORONARY ARTERY (H): ICD-10-CM

## 2018-06-13 DIAGNOSIS — E78.2 MIXED HYPERLIPIDEMIA: ICD-10-CM

## 2018-06-13 DIAGNOSIS — I25.10 CORONARY ARTERY DISEASE INVOLVING NATIVE CORONARY ARTERY OF NATIVE HEART WITHOUT ANGINA PECTORIS: Primary | ICD-10-CM

## 2018-06-13 LAB
CHOLEST SERPL-MCNC: 143 MG/DL
HDLC SERPL-MCNC: 42 MG/DL
LDLC SERPL CALC-MCNC: 56 MG/DL
NONHDLC SERPL-MCNC: 101 MG/DL
TRIGL SERPL-MCNC: 226 MG/DL

## 2018-06-13 PROCEDURE — 80061 LIPID PANEL: CPT | Performed by: INTERNAL MEDICINE

## 2018-06-13 PROCEDURE — 99214 OFFICE O/P EST MOD 30 MIN: CPT | Mod: ZP | Performed by: NURSE PRACTITIONER

## 2018-06-13 PROCEDURE — 36415 COLL VENOUS BLD VENIPUNCTURE: CPT | Performed by: INTERNAL MEDICINE

## 2018-06-13 PROCEDURE — 93005 ELECTROCARDIOGRAM TRACING: CPT | Mod: ZF

## 2018-06-13 PROCEDURE — 93010 ELECTROCARDIOGRAM REPORT: CPT | Mod: ZP | Performed by: INTERNAL MEDICINE

## 2018-06-13 PROCEDURE — G0463 HOSPITAL OUTPT CLINIC VISIT: HCPCS | Mod: 25,ZF

## 2018-06-13 RX ORDER — EMPAGLIFLOZIN 10 MG/1
TABLET, FILM COATED ORAL
Refills: 3 | COMMUNITY
Start: 2018-05-30

## 2018-06-13 RX ORDER — LOSARTAN POTASSIUM 50 MG/1
TABLET ORAL
Refills: 3 | COMMUNITY
Start: 2018-06-08

## 2018-06-13 RX ADMIN — Medication 3 ML: at 09:00

## 2018-06-13 ASSESSMENT — PAIN SCALES - GENERAL: PAINLEVEL: NO PAIN (0)

## 2018-06-13 NOTE — PROGRESS NOTES
Cardiology Clinic Note  June 13, 2018      HPI:  Anselmo Gresham is a 53 year old male who presents for follow-up of CAD. His history is notable for prior tobacco use, SANA, HTN, HLD, DM and CAD s/p anterior STEMI on 10/13/2017. He had 2 LADONNA to mLAD and TTE showed LVEF 44% with anterior wall motion abnormality. He was last seen by Dr. Connors in 11/2017 at which time he was without cardiovascular symptoms.    Today Mr. Gresham reports feeling well. He remains moderately active. After his MI he was exercising 3-5x/week for about 30-45 minutes. For the past 3 weeks he has not been exercising as he has been tired and working long hours. 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 He denies recent chest pain, tightness or pressure. He reports rare episodes of shortness of breath that occur position changes and with bending over. He denies shortness of breath with exertion, orthopnea, PND, lower extremity edema or weight gain. No palpitations, lightheadedness or syncope. He has not used tobacco since his heart attack. He attempts to follow a heart healthy diet. He is on optimal medical therapy and has been compliant with his aspirin, ticagrelor, high intensity statin, ACE-I and beta blocker. ECG today shows normal sinus rhythm with first degree AVB and prior septal infarction. Echo shows recovery in LV function to 60-65% with resolution of anterior wall motion abnormality.     Patient has a history of chronic low back pain. He has received cortisone shots in the past which typically alleviate the pain for 3-5 years. Recently he has been experiencing right lateral leg numbness, without associated pain. He denies any functional limitations with this but the numbness is bothersome. He is wondering if he can hold antiplatelet therapy in order to get a cortisone shot.     Past medical history:  Past Medical History:   Diagnosis Date     Diabetes mellitus (H)       Dyspepsia and other specified disorders of function of stomach      Myocardial infarction     10/12/2017     Obesity      Unspecified sleep apnea      Medications:    Current Outpatient Prescriptions on File Prior to Visit:  aspirin 81 MG EC tablet Take 1 tablet (81 mg) by mouth daily   atorvastatin (LIPITOR) 80 MG tablet Take 1 tablet (80 mg) by mouth daily   carvedilol (COREG) 12.5 MG tablet Take 1 tablet (12.5 mg) by mouth 2 times daily   GLIPIZIDE PO Take by mouth 2 times daily (before meals)   lisinopril (PRINIVIL/ZESTRIL) 5 MG tablet Take 3 tablets (15 mg) by mouth daily   METFORMIN HCL PO Take 1,000 mg by mouth 2 times daily (with meals).   oxyCODONE (ROXICODONE) 5 MG immediate release tablet Take 1 tablet (5 mg) by mouth every 6 hours as needed   ticagrelor (BRILINTA) 90 MG tablet Take 1 tablet (90 mg) by mouth 2 times daily   cyclobenzaprine (FLEXERIL) 10 MG tablet Take 1 tablet (10 mg) by mouth 3 times daily as needed for muscle spasms (Patient not taking: Reported on 6/13/2018)   metaxalone (SKELAXIN) 800 MG tablet Take 1 tablet (800 mg) by mouth 3 times daily as needed for moderate pain (Patient not taking: Reported on 6/13/2018)   methylPREDNISolone (MEDROL DOSEPAK) 4 MG tablet Follow package instructions (Patient not taking: Reported on 6/13/2018)   nicotine (NICODERM CQ) 14 MG/24HR 24 hr patch Place 1 patch onto the skin daily (Patient not taking: Reported on 6/13/2018)   prochlorperazine (COMPAZINE) 25 MG suppository Place 1 suppository (25 mg) rectally every 12 hours as needed for nausea (Patient not taking: Reported on 6/13/2018)     Current Facility-Administered Medications on File Prior to Visit:  EPINEPHrine PF (ADRENALIN) injection 1 mg   flumazenil (ROMAZICON) injection 0.2 mg   furosemide (LASIX) injection  mg   heparin (porcine) injection 1,000-10,000 Units   heparin infusion 25,000 units in 0.45% NaCl 250 mL   hydrALAZINE (APRESOLINE) injection 10-20 mg   lidocaine (PF) (XYLOCAINE) 1  "% injection 300 mg   metoprolol (LOPRESSOR) injection 5 mg   naloxone (NARCAN) injection 0.4 mg   niCARdipine (CARDENE) injection 100 mcg   NIFEdipine (PROCARDIA) capsule 10 mg   nitroGLYcerin (NITROSTAT) sublingual tablet 0.4 mg   nitroGLYcerin injection 100-200 mcg   nitroGLYcerin injection 100-500 mcg   [COMPLETED] perflutren diluted 1mL to 2mL with saline (OPTISON) diluted injection 3 mL       Allergies:    No Known Allergies    Family and social history:    Family History   Problem Relation Age of Onset     DIABETES Father      CANCER Father      lung and liver       Social History     Social History     Marital status: Single     Spouse name: N/A     Number of children: N/A     Years of education: N/A     Occupational History     manager      Social History Main Topics     Smoking status: Former Smoker     Years: 0.00     Types: Cigarettes     Quit date: 10/11/2017     Smokeless tobacco: Never Used     Alcohol use Yes      Comment: States occ     Drug use: No     Sexual activity: Yes     Partners: Female     Other Topics Concern     Not on file     Social History Narrative     Review of Systems:  Skin: No skin rash or ulcers.  Eyes: No red eye.  Ears/Nose/Throat: No ear discharge, nasal congestion, sore throat or dysphagia.  Respiratory: No cough or hemoptysis.  Cardiovascular: See HPI.    Gastrointestinal: No abdominal pain, nausea, vomiting, hematemesis or melena.  Genitourinary: No increased frequency or urgency of urine. No dysuria or hematuria.  Musculoskeletal: No polyarthralgia or myalgias.  Neurologic: No headaches, seizure or focal weakness.  Psychiatric: No hallucinations.  Hematologic/Lymphatic/Immunologic: No bleeding tendency.  Endocrine: No heat or cold intolerance, abnormal facial hair or alopecia.    Vital signs:  /89 (BP Location: Left arm, Cuff Size: Adult Large)  Pulse 83  Ht 1.791 m (5' 10.5\")  Wt 127.7 kg (281 lb 9.6 oz)  SpO2 95%  BMI 39.83 kg/m2   Wt Readings from Last 2 " Encounters:   06/13/18 127.7 kg (281 lb 9.6 oz)   11/02/17 124.3 kg (274 lb 1.6 oz)     Physical Exam:  Gen: Obese male. NAD.    HEENT: No conjunctival pallor or scleral icterus, MMM. Clear oropharynx.    Neck: No JVD. No thyroid enlargement or cervical adenopathy.    Chest: Clear to auscultation bilaterally.    CV: Normal first and second heart sounds. No murmurs or gallop appreciated.    Abdomen: Soft, non-tender, non-distended, BS+.  Ext: No edema. Warm and well perfused with normal capillary refill.    Skin: No skin rash or ulcers.  Neuro: alert, oriented and appropriately conversant.    Psych: Normal affect and speech.    Labs:    Recent Labs   Lab Test  06/13/18   0743  10/14/17   0933   03/31/12   0611   CHOL  143  133   --   154   HDL  42  35*   --   40   LDL  56  38   --   91   TRIG  226*  300*   < >  120   CHOLHDLRATIO   --    --    --   3.9    < > = values in this interval not displayed.     Diagnostics:    ECG today: NSR prior septal infarct    Echo today:     Interpretation Summary  Left ventricular function is normal. EF 60-65%. No regional wall motion  abnormalities are seen.  Right ventricular function, chamber size, wall motion, and thickness are  normal.  Both atria appear normal.  No pericardial effusion is present.     This study was compared with the study from 10/13/17: LVEF has improved and  regional wall motion abnormalities are no longer seen.      Assessment and Plan:  This is a 53 year old with HTN, HLD and CAD with anterior MI 10/2017 s/p LADONNA to pLAD who presents for follow-up.    CAD s/p anterior STEMI 10/2017: Stable, denies recent anginal symptoms. ECG today shows normal sinus rhythm with evidence of prior septal infarct. Echo shows recovery in LV function to 60-65% with resolution of anterior wall motion abnormality.   -- Continue ASA 81 mg lifelong  -- Continue Brillinta uninterrupted 12 months post PCI  -- I would not advise holding antiplatelet therapy for epidural injection before  the patient is 12 months out from PCI. In the absence of severe pain or functional limitations, the patient is willing to wait 6 more months to receive his injection. Alternatively, if the shot can be given while on ASA and Brillinta he can receive it at any time.     Hypertension, goal BP < 130/80: At goal.  -- Continue carvedilol 12.5 mg BID and lisinopril 5 mg daily    Hyperlipidemia, goal < 70: At goal, LDL today 56.  -- Continue atorvastatin 80 mg   -- Fasting lipids 1 year    Type II DM: Hgb A1C above goal.  -- Continue current regimen  -- Management per PCP      Follow-up: RTC in 6 months.

## 2018-06-13 NOTE — LETTER
6/13/2018      RE: Anselmo Gresham  [address] [place]  [facility] [address]       Dear Colleague,    Thank you for the opportunity to participate in the care of your patient, Anselmo Gresham, at the [facility] HEART [facility]. Please see a copy of my visit note below.    Cardiology Clinic Note  June 13, 2018      HPI:  Anselmo Gresham is a 53 year old male who presents for follow-up of CAD. His history is notable for prior tobacco use, SANA, HTN, HLD, DM and CAD s/p anterior STEMI on 10/13/2017. He had 2 LADONNA to mLAD and TTE showed LVEF 44% with anterior wall motion abnormality.  He was last seen by Dr. Connors in 11/2017 at which time he was without cardiovascular symptoms.    Today Mr. Gresham reports feeling well. He remains moderately active. After his MI he was exercising 3-5x/week for about 30-45 minutes. For the past 3 weeks he has not been exercising as he has been tired and working long hours. 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 He denies recent chest pain, tightness or pressure. He reports rare episodes of shortness of breath that occur position changes and with bending over. He denies shortness of breath with exertion, orthopnea, PND, lower extremity edema or weight gain. No palpitations, lightheadedness or syncope. He has not used tobacco since his heart attack. He attempts to follow a heart healthy diet. He is on optimal medical therapy and has been compliant with his aspirin, ticagrelor, high intensity statin, ACE-I and beta blocker. ECG today shows normal sinus rhythm with first degree AVB and prior septal infarction. Echo shows recovery in LV function to 60-65% with resolution of anterior wall motion abnormality.     Patient has a history of chronic low back pain. He has received cortisone shots in the past which typically alleviate the pain for 3-5 years. Recently he has been experiencing  right lateral leg numbness, without associated pain. He denies any functional limitations with this but the numbness is bothersome. He is wondering if he can hold antiplatelet therapy in order to get a cortisone shot.     Past medical history:  Past Medical History:   Diagnosis Date     Diabetes mellitus (H)      Dyspepsia and other specified disorders of function of stomach      Myocardial infarction     10/12/2017     Obesity      Unspecified sleep apnea      Medications:    Current Outpatient Prescriptions on File Prior to Visit:  aspirin 81 MG EC tablet Take 1 tablet (81 mg) by mouth daily   atorvastatin (LIPITOR) 80 MG tablet Take 1 tablet (80 mg) by mouth daily   carvedilol (COREG) 12.5 MG tablet Take 1 tablet (12.5 mg) by mouth 2 times daily   GLIPIZIDE PO Take by mouth 2 times daily (before meals)   lisinopril (PRINIVIL/ZESTRIL) 5 MG tablet Take 3 tablets (15 mg) by mouth daily   METFORMIN HCL PO Take 1,000 mg by mouth 2 times daily (with meals).   oxyCODONE (ROXICODONE) 5 MG immediate release tablet Take 1 tablet (5 mg) by mouth every 6 hours as needed   ticagrelor (BRILINTA) 90 MG tablet Take 1 tablet (90 mg) by mouth 2 times daily   cyclobenzaprine (FLEXERIL) 10 MG tablet Take 1 tablet (10 mg) by mouth 3 times daily as needed for muscle spasms (Patient not taking: Reported on 6/13/2018)   metaxalone (SKELAXIN) 800 MG tablet Take 1 tablet (800 mg) by mouth 3 times daily as needed for moderate pain (Patient not taking: Reported on 6/13/2018)   methylPREDNISolone (MEDROL DOSEPAK) 4 MG tablet Follow package instructions (Patient not taking: Reported on 6/13/2018)   nicotine (NICODERM CQ) 14 MG/24HR 24 hr patch Place 1 patch onto the skin daily (Patient not taking: Reported on 6/13/2018)   prochlorperazine (COMPAZINE) 25 MG suppository Place 1 suppository (25 mg) rectally every 12 hours as needed for nausea (Patient not taking: Reported on 6/13/2018)     Current Facility-Administered Medications on File Prior  to Visit:  EPINEPHrine PF (ADRENALIN) injection 1 mg   flumazenil (ROMAZICON) injection 0.2 mg   furosemide (LASIX) injection  mg   heparin (porcine) injection 1,000-10,000 Units   heparin infusion 25,000 units in 0.45% NaCl 250 mL   hydrALAZINE (APRESOLINE) injection 10-20 mg   lidocaine (PF) (XYLOCAINE) 1 % injection 300 mg   metoprolol (LOPRESSOR) injection 5 mg   naloxone (NARCAN) injection 0.4 mg   niCARdipine (CARDENE) injection 100 mcg   NIFEdipine (PROCARDIA) capsule 10 mg   nitroGLYcerin (NITROSTAT) sublingual tablet 0.4 mg   nitroGLYcerin injection 100-200 mcg   nitroGLYcerin injection 100-500 mcg   [COMPLETED] perflutren diluted 1mL to 2mL with saline (OPTISON) diluted injection 3 mL       Allergies:    No Known Allergies    Family and social history:    Family History   Problem Relation Age of Onset     DIABETES Father      CANCER Father      lung and liver       Social History     Social History     Marital status: Single     Spouse name: N/A     Number of children: N/A     Years of education: N/A     Occupational History     manager      Social History Main Topics     Smoking status: Former Smoker     Years: 0.00     Types: Cigarettes     Quit date: 10/11/2017     Smokeless tobacco: Never Used     Alcohol use Yes      Comment: States occ     Drug use: No     Sexual activity: Yes     Partners: Female     Other Topics Concern     Not on file     Social History Narrative     Review of Systems:  Skin: No skin rash or ulcers.  Eyes: No red eye.  Ears/Nose/Throat: No ear discharge, nasal congestion, sore throat or dysphagia.  Respiratory: No cough or hemoptysis.  Cardiovascular: See HPI.    Gastrointestinal: No abdominal pain, nausea, vomiting, hematemesis or melena.  Genitourinary: No increased frequency or urgency of urine. No dysuria or hematuria.  Musculoskeletal: No polyarthralgia or myalgias.  Neurologic: No headaches, seizure or focal weakness.  Psychiatric: No  "hallucinations.  Hematologic/Lymphatic/Immunologic: No bleeding tendency.  Endocrine: No heat or cold intolerance, abnormal facial hair or alopecia.    Vital signs:  /89 (BP Location: Left arm, Cuff Size: Adult Large)  Pulse 83  Ht 1.791 m (5' 10.5\")  Wt 127.7 kg (281 lb 9.6 oz)  SpO2 95%  BMI 39.83 kg/m2   Wt Readings from Last 2 Encounters:   06/13/18 127.7 kg (281 lb 9.6 oz)   11/02/17 124.3 kg (274 lb 1.6 oz)     Physical Exam:  Gen: Obese male. NAD.    HEENT: No conjunctival pallor or scleral icterus, MMM. Clear oropharynx.    Neck: No JVD. No thyroid enlargement or cervical adenopathy.    Chest: Clear to auscultation bilaterally.    CV: Normal first and second heart sounds. No murmurs or gallop appreciated.    Abdomen: Soft, non-tender, non-distended, BS+.  Ext: No edema. Warm and well perfused with normal capillary refill.    Skin: No skin rash or ulcers.  Neuro: alert, oriented and appropriately conversant.    Psych: Normal affect and speech.    Labs:    Recent Labs   Lab Test  06/13/18   0743  10/14/17   0933   03/31/12   0611   CHOL  143  133   --   154   HDL  42  35*   --   40   LDL  56  38   --   91   TRIG  226*  300*   < >  120   CHOLHDLRATIO   --    --    --   3.9    < > = values in this interval not displayed.     Diagnostics:    ECG today: NSR prior septal infarct    Echo today:     Interpretation Summary  Left ventricular function is normal. EF 60-65%. No regional wall motion  abnormalities are seen.  Right ventricular function, chamber size, wall motion, and thickness are  normal.  Both atria appear normal.  No pericardial effusion is present.     This study was compared with the study from 10/13/17: LVEF has improved and  regional wall motion abnormalities are no longer seen.      Assessment and Plan:  This is a 53 year old with HTN, HLD and CAD with anterior MI 10/2017 s/p LADONNA to pLAD who presents for follow-up.    CAD s/p anterior STEMI 10/2017: Stable, denies recent anginal symptoms. " ECG today shows normal sinus rhythm with evidence of prior septal infarct. Echo shows recovery in LV function to 60-65% with resolution of anterior wall motion abnormality.   -- Continue ASA 81 mg lifelong  -- Continue Brillinta uninterrupted 12 months post PCI  -- I would not advise holding antiplatelet therapy for epidural injection before the patient is 12 months out from PCI. In the absence of severe pain or functional limitations, the patient is willing to wait 6 more months to receive his injection. Alternatively, if the shot can be given while on ASA and Brillinta he can receive it at any time.     Hypertension, goal BP < 130/80: At goal.  -- Continue carvedilol 12.5 mg BID and lisinopril 5 mg daily    Hyperlipidemia, goal < 70: At goal, LDL today 56.  -- Continue atorvastatin 80 mg   -- Fasting lipids 1 year    Type II DM: Hgb A1C above goal.  -- Continue current regimen  -- Management per PCP      Follow-up: RTC in 6 months.          Results discussed directly with patient while patient was present. Any further details documented in the note.  Angelique Hernandes NP    Please do not hesitate to contact me if you have any questions/concerns.     Sincerely,     Angelique Hernandes NP

## 2018-06-13 NOTE — LETTER
Tatianna 15, 2018       TO: Anselmo Gresham   [address]  [facility] [address]       Dear Mr. Gresham,    The results of your recent echocardiogram look good. We will see you back in clinic in 6 months.    Results for orders placed or performed in visit on 18   ECHO COMPLETE WITH OPTISON    Narrative    [id number]  [id number]  [id number]  [id number]^LUPILLO^MICHELLE^           [facility]  Diagnostic and Treamtent-3rd Floor  [address] [place] [address]     Name: ANSELMO GRESHAM  MRN: [id number]  : 1965  Study Date: 2018 08:09 AM  Age: 53 yrs  Gender: Male  Patient Location: [facility] [place]  Reason For Study: ST elevation myocardial infarction involving left main  coronar  Ordering Physician: MICHELLE WESLEY  Referring Physician: MICHELLE WESLEY  Performed By: Maye Rdz RDCS     BSA: 2.4 m2  Height: 71 in  Weight: 274 lb  BP: 110/75 mmHg  _____________________________________________________________________________  __        Procedure  Echocardiogram with two-dimensional, color and spectral Doppler performed.  Optison (NDC #9997-0477-30) given intravenously. Patient was given 3.0 ml  mixture of 3 ml Optison and 6 ml saline. 6.0 ml wasted. IV start location  RAC .  _____________________________________________________________________________  __        Interpretation Summary  Left ventricular function is normal. EF 60-65%. No regional wall motion  abnormalities are seen.  Right ventricular function, chamber size, wall motion, and thickness are  normal.  Both atria appear normal.  No pericardial effusion is present.     This study was compared with the study from 10/13/17: LVEF has improved and  regional wall motion abnormalities are no longer seen.  _____________________________________________________________________________  __        Left Ventricle  Left ventricular function is normal.The EF is 60-65%. Relative wall thickness  is increased  consistent with concentric remodeling. Left ventricular diastolic  function is normal. No regional wall motion abnormalities are seen.     Right Ventricle  Right ventricular function, chamber size, wall motion, and thickness are  normal.     Atria  Both atria appear normal. The atrial septum is intact as assessed by color  Doppler .     Mitral Valve  The mitral valve is normal. Mild mitral annular calcification is present.        Aortic Valve  Aortic valve is normal in structure and function. The aortic valve is  tricuspid.     Tricuspid Valve  Trace tricuspid insufficiency is present. Pulmonary artery systolic pressure  cannot be assessed. The peak velocity of the tricuspid regurgitant jet is not  obtainable.     Pulmonic Valve  The pulmonic valve is normal.     Vessels  The aorta root is normal. The thoracic aorta is normal. The pulmonary artery  cannot be assessed. Sinuses of Valsalva 3.5 cm. Estimated right atrial  pressure is 5-10 mmHg.     Pericardium  No pericardial effusion is present.        Compared to Previous Study  This study was compared with the study from 10/13/17 . LVEF has improved and  regional wall motion abnormalities are no longer seen.     Attestation  Chris Behrens, [employer] Cardiothoracic Anesthesiology Fellow.  _____________________________________________________________________________  __     MMode/2D Measurements & Calculations  RVDd: 4.5 cm  IVSd: 1.1 cm  LVIDd: 4.5 cm  LVIDs: 2.8 cm  LVPWd: 1.1 cm  FS: 38.2 %  LV mass(C)d: 172.7 grams  LV mass(C)dI: 71.6 grams/m2  Ao root diam: 3.5 cm  LA dimension: 4.2 cm  LA/Ao: 1.2  LVOT diam: 2.1 cm  LVOT area: 3.5 cm2  LA Volume (BP): 56.0 ml     LA Volume Index (BP): 23.2 ml/m2  RWT: 0.48        Doppler Measurements & Calculations  MV E max kevin: 62.9 cm/sec  MV A max kevin: 68.5 cm/sec  MV E/A: 0.92  MV dec time: 0.27 sec  Ao V2 max: 131.0 cm/sec  Ao max P.9 mmHg  Ao V2 mean: 92.0 cm/sec  Ao mean P.0 mmHg  Ao V2 VTI: 26.6 cm  PA V2 max:  119.4 cm/sec  PA max P.7 mmHg  PA acc time: 0.07 sec  E/E' av.0  Lateral E/e': 13.7  Medial E/e': 12.4        _____________________________________________________________________________  __        Report approved by: Lewis Merino 2018 10:01 AM            Renetta Gage, RN  Cardiology Care Coordinator  [phone] option 1, then option 3 to speak with a triage nurse  [facility]

## 2018-06-13 NOTE — NURSING NOTE
Patient presents today for resting echo ordered by MD.     Echo Tech provided patient education about resting echo. IV started in LAC.   IV start documented in  [employer].  Echo technician completed resting echo. Patient monitored according to Optison protocol. Data transferred to [facility] for final interpretation through [employer].     Optison medication:  Amount used 3ml Optison mixed with 6ml Saline - [id number].  6ml Wasted.   After completion of resting echo, IV removed.    Patient education provided about cardiology interpretation and primary provider will be notified of results.    Maye Rdz RDCS

## 2018-06-13 NOTE — MR AVS SNAPSHOT
After Visit Summary   2018    Anselmo Gresham    MRN: [id number]           Patient Information     Date Of Birth          1965        Visit Information        Provider Department      2018 5:45 PM Angelique Hernandes NP [facility]        Today's Diagnoses     CAD (coronary artery disease)    -  1    Mixed hyperlipidemia        Benign essential hypertension          Care Instructions    You were seen today in the Cardiovascular Clinic at the [facility].    Cardiology provider you saw during your visit Angelique Hernandes NP.    1. Your echo shows recovery in heart function.  2. Your vitals and labs are stable.  3. Continues aspirin and brillinta daily.  4. Would not recommend holding ASA and/or Brillinta for cortisone shot until you are 12 months out from your heart attack (10/2018). If they are willing to give injection while on both ASA and Brillinta it can be done at anytime.  5. Continue heart healthy diet and 30 minutes of aerobic exercise daily.  6. Please make a follow-up appointment with Dr. Connors in 6 months.     Questions and [phone].   First press #1 for the [employer] and then press #3 for Medical Questions to reach the Cardiology triage nurse.     On Call Cardiologist for after hours or on weekends: [phone], press option #4 and ask to speak to the on-call Cardiologist.             Follow-ups after your visit        Additional Services     Follow-Up with Cardiologist                 Follow-up notes from your care team     See patient instructions section of the AVS Return in about 6 months (around 2018).      Future tests that were ordered for you today     Open Future Orders        Priority Expected Expires Ordered    Follow-Up with Cardiologist Routine 12/10/2018 3/10/2019 2018            Who to contact     If you have questions or need follow up information about today's clinic visit or your schedule please contact [facility] directly at [phone].  Normal or non-critical lab and imaging results will be communicated to you by MyChart, letter or phone within 4 business days after the clinic has received the results. If you do not hear from us within 7 days, please contact the clinic through [employer] or phone. If you have a critical or abnormal lab result, we will notify you by phone as soon as possible.  Submit refill requests through [employer] or call your pharmacy and they will forward the refill request to us. Please allow 3 business days for your refill to be completed.          Additional Information About Your Visit        [employer] Information     [employer] lets you send messages to your doctor, view your test results, renew your prescriptions, schedule appointments and more. To sign up, go to [email or web address] . Click on \"Log in\" on the left side of the screen, which will take you to the Welcome page. Then click on \"Sign up Now\" on the right side of the page.     You will be asked to enter the access code listed below, as well as some personal information. Please follow the directions to create your username and password.     Your access code is: [email or web address]  Expires: 2018  7:27 AM     Your access code will  in 90 days. If you need help or a new code, please call your [facility] clinic or [phone].        Care EveryWhere ID     This is your Care EveryWhere ID. This could be used by other organizations to access your [facility] medical records  [id number]        Your Vitals Were     Pulse Height Pulse Oximetry BMI (Body Mass Index)          83 1.791 m (5' 10.5\") 95% 39.83 kg/m2         Blood Pressure from Last 3 Encounters:   18 125/89   17 110/75   10/24/17 (!) 132/95    Weight from Last 3 Encounters:   18 127.7 kg (281 lb 9.6 oz)   17 124.3 kg (274 lb 1.6 oz)   10/24/17 122.5 kg (270 lb)              We Performed the Following     EKG 12-lead, tracing only (Same Day)        " Primary Care Provider Office Phone # Fax #    Sulma Tracey Sharma [phone]       [facility] [address]  [facility] [address]        Equal Access to Services     ARTEMIO DURON : Hadii alyssa ku marquitao Soomaali, waaxda luqadaha, qaybta kaalmada adeegyada, rocio beyn margie jean laPlacidosantos bosch. So [facility] [phone].    ATENCIÓN: Si habla español, tiene a beltran disposición servicios gratuitos de asistencia lingüística. Llame al [phone].    We comply with applicable federal civil rights laws and [place] laws. We do not discriminate on the basis of race, color, national origin, age, disability, sex, sexual orientation, or gender identity.            Thank you!     Thank you for choosing [facility]  for your care. Our goal is always to provide you with excellent care. Hearing back from our patients is one way we can continue to improve our services. Please take a few minutes to complete the written survey that you may receive in the mail after your visit with us. Thank you!             Your Updated Medication List - Protect others around you: Learn how to safely use, store and throw away your medicines at www.disposemymeds.org.          This list is accurate as of 6/13/18  6:36 PM.  Always use your most recent med list.                   Brand Name Dispense Instructions for use Diagnosis    aspirin 81 MG EC tablet     90 tablet    Take 1 tablet (81 mg) by mouth daily    ST elevation myocardial infarction (STEMI), unspecified artery (H)       atorvastatin 80 MG tablet    LIPITOR    90 tablet    Take 1 tablet (80 mg) by mouth daily    ST elevation myocardial infarction (STEMI), unspecified artery (H)       carvedilol 12.5 MG tablet    COREG    180 tablet    Take 1 tablet (12.5 mg) by mouth 2 times daily    ST elevation myocardial infarction (STEMI), unspecified artery (H)       cyclobenzaprine 10 MG tablet    FLEXERIL    90 tablet    Take 1 tablet (10 mg) by mouth  3 times daily as needed for muscle spasms        GLIPIZIDE PO      Take by mouth 2 times daily (before meals)        JARDIANCE 10 MG Tabs tablet   Generic drug:  empagliflozin      TK 1 T PO QD        lisinopril 5 MG tablet    PRINIVIL/ZESTRIL    90 tablet    Take 3 tablets (15 mg) by mouth daily    ST elevation myocardial infarction (STEMI), unspecified artery (H)       losartan 50 MG tablet    COZAAR     TK 1 T PO QD        metaxalone 800 MG tablet    SKELAXIN    30 tablet    Take 1 tablet (800 mg) by mouth 3 times daily as needed for moderate pain        METFORMIN HCL PO      Take 1,000 mg by mouth 2 times daily (with meals).        methylPREDNISolone 4 MG tablet    MEDROL DOSEPAK    21 tablet    Follow package instructions        nicotine 14 MG/24HR 24 hr patch    NICODERM CQ    30 patch    Place 1 patch onto the skin daily    ST elevation myocardial infarction (STEMI), unspecified artery (H), Tobacco abuse       oxyCODONE IR 5 MG tablet    ROXICODONE    20 tablet    Take 1 tablet (5 mg) by mouth every 6 hours as needed    Acute pancreatitis       prochlorperazine 25 MG Suppository    COMPAZINE    20 suppository    Place 1 suppository (25 mg) rectally every 12 hours as needed for nausea        ticagrelor 90 MG tablet    BRILINTA    180 tablet    Take 1 tablet (90 mg) by mouth 2 times daily    ST elevation myocardial infarction (STEMI), unspecified artery (H)

## 2018-06-13 NOTE — NURSING NOTE
Chief Complaint   Patient presents with     Follow Up For      53 year old male with hx prior tobacco use, HTN, HLD, IDDM who had anterior STEMI on 10/13/2017. He had 2 LADONNA to mLAD and TTE showed LVEF 44% with anterior wall motion abnormality. He is here for 6 month f/u.     Vitals were taken and medications were reconciled. EKG preformed    ASTON Steiner  5:40 PM

## 2018-06-13 NOTE — PATIENT INSTRUCTIONS
You were seen today in the Cardiovascular Clinic at the [facility].    Cardiology provider you saw during your visit Angelique Hernandes NP.    1. Your echo shows recovery in heart function.  2. Your vitals and labs are stable.  3. Continues aspirin and brillinta daily.  4. Would not recommend holding ASA and/or Brillinta for cortisone shot until you are 12 months out from your heart attack (10/2018). If they are willing to give injection while on both ASA and Brillinta it can be done at anytime.  5. Continue heart healthy diet and 30 minutes of aerobic exercise daily.  6. Please make a follow-up appointment with Dr. Connors in 6 months.     Questions and [phone].   First press #1 for the University and then press #3 for Medical Questions to reach the Cardiology triage nurse.     On Call Cardiologist for after hours or on weekends: [phone], press option #4 and ask to speak to the on-call Cardiologist.

## 2018-06-14 LAB — INTERPRETATION ECG - MUSE: NORMAL

## 2018-06-14 NOTE — PROGRESS NOTES
Results discussed directly with patient while patient was present. Any further details documented in the note.   Angelique Hernandes NP

## 2018-10-30 DIAGNOSIS — I21.3 ST ELEVATION MYOCARDIAL INFARCTION (STEMI), UNSPECIFIED ARTERY (H): ICD-10-CM

## 2018-11-01 DIAGNOSIS — I21.3 ST ELEVATION MYOCARDIAL INFARCTION (STEMI), UNSPECIFIED ARTERY (H): ICD-10-CM

## 2018-11-01 RX ORDER — CARVEDILOL 12.5 MG/1
12.5 TABLET ORAL 2 TIMES DAILY
Qty: 180 TABLET | Refills: 3 | Status: SHIPPED | OUTPATIENT
Start: 2018-11-01 | End: 2019-11-24

## 2018-11-01 RX ORDER — CARVEDILOL 12.5 MG/1
12.5 TABLET ORAL 2 TIMES DAILY
Qty: 180 TABLET | Refills: 3 | Status: SHIPPED | OUTPATIENT
Start: 2018-11-01

## 2018-11-27 DIAGNOSIS — I21.3 ST ELEVATION MYOCARDIAL INFARCTION (STEMI), UNSPECIFIED ARTERY (H): ICD-10-CM

## 2018-12-28 ENCOUNTER — PATIENT OUTREACH (OUTPATIENT)
Dept: CARE COORDINATION | Facility: CLINIC | Age: 53
End: 2018-12-28

## 2019-01-03 ENCOUNTER — OFFICE VISIT (OUTPATIENT)
Dept: CARDIOLOGY | Facility: CLINIC | Age: 54
End: 2019-01-03
Attending: INTERNAL MEDICINE
Payer: COMMERCIAL

## 2019-01-03 VITALS
OXYGEN SATURATION: 95 % | WEIGHT: 276 LBS | SYSTOLIC BLOOD PRESSURE: 112 MMHG | DIASTOLIC BLOOD PRESSURE: 79 MMHG | BODY MASS INDEX: 38.64 KG/M2 | HEIGHT: 71 IN | HEART RATE: 91 BPM

## 2019-01-03 DIAGNOSIS — I25.10 CORONARY ARTERY DISEASE INVOLVING NATIVE CORONARY ARTERY OF NATIVE HEART WITHOUT ANGINA PECTORIS: ICD-10-CM

## 2019-01-03 DIAGNOSIS — E78.2 MIXED HYPERLIPIDEMIA: ICD-10-CM

## 2019-01-03 PROCEDURE — 99214 OFFICE O/P EST MOD 30 MIN: CPT | Mod: ZP | Performed by: INTERNAL MEDICINE

## 2019-01-03 PROCEDURE — G0463 HOSPITAL OUTPT CLINIC VISIT: HCPCS | Mod: ZF

## 2019-01-03 ASSESSMENT — PAIN SCALES - GENERAL: PAINLEVEL: MILD PAIN (2)

## 2019-01-03 ASSESSMENT — MIFFLIN-ST. JEOR: SCORE: 2121.12

## 2019-01-03 NOTE — PROGRESS NOTES
CARDIOVASCULAR DIVISION    CARDIOLOGY CLINIC NOTE    PRIMARY CARDIOLOGIST: Dr. Connors      PERTINENT CLINICAL HISTORY:     Anselmo Gresham is a very pleasant 53 year old male with hx prior tobacco use, HTN, HLD, IDDM who had anterior STEMI on 10/13/2017.  He had 2 LADONNA to mLAD and TTE showed LVEF 44% with anterior wall motion abnormality.  He is on good medical management and has been compliant with his aspirin, ticagrelor, high intensity statin, ACE-I and beta blocker.  Since his LADONNA, he has felt well.  He quit smoking and he has been very conscientious of his nutritional intake where he now eats baked foods that are low fat and low salt.  He did go to cardiac rehab once and felt that he could do exercises on his own so he has been walking 3-5x/week on his own for about 30 minutes.  He understands he needs to take better care of himself and is willing to put in the work since he let himself go over last few years due to a hard divorce.    He denies any further chest/shoulder discomfort, lightheadedness, dizziness, ROSEN, SOB.      He did go to ED about a week ago for back pain/spinal stenosis.  He was give prednisone pack and was scheduled for an epidural injection this coming week.  He did notice that his blood sugars increased to the 300s when he was on short steroid burst.         PAST MEDICAL HISTORY:     Past Medical History:   Diagnosis Date     Diabetes mellitus (H)      Dyspepsia and other specified disorders of function of stomach      Myocardial infarction (H)     10/12/2017     Obesity      Unspecified sleep apnea         PAST SURGICAL HISTORY:     Past Surgical History:   Procedure Laterality Date     C ANESTH,REPAIR LO HERNIA VENTR/INCIS       CARDIAC SURGERY      2 stents placed     HC UGI ENDOSCOPY W EUS  4/2/2012    Procedure:COMBINED ENDOSCOPIC ULTRASOUND, ESOPHAGOSCOPY, GASTROSCOPY, DUODENOSCOPY (EGD); Surgeon:ALANA LEGGETT; Location: GI     HC UGI ENDOSCOPY W EUS  12/17/2013     Procedure: COMBINED ENDOSCOPIC ULTRASOUND, ESOPHAGOSCOPY, GASTROSCOPY, DUODENOSCOPY (EGD);  Surgeon: Rachel Diaz MD;  Location: [facility]     HEMORRHOIDECTOMY,EXTERNAL       LAPAROSCOPIC CHOLECYSTECTOMY  4/3/2012    Procedure:LAPAROSCOPIC CHOLECYSTECTOMY; Laparoscopic Cholecystectomy ; Surgeon:LESIA RODRIGUEZ; Location:[facility]        CURRENT MEDICATIONS:     Current Outpatient Medications   Medication Sig Dispense Refill     aspirin (ASA) 81 MG EC tablet Take 1 tablet (81 mg) by mouth daily 90 tablet 3     atorvastatin (LIPITOR) 80 MG tablet Take 1 tablet (80 mg) by mouth daily 90 tablet 3     carvedilol (COREG) 12.5 MG tablet Take 1 tablet (12.5 mg) by mouth 2 times daily 180 tablet 3     carvedilol (COREG) 12.5 MG tablet Take 1 tablet (12.5 mg) by mouth 2 times daily 180 tablet 3     GLIPIZIDE PO Take by mouth 2 times daily (before meals)       losartan (COZAAR) 50 MG tablet TK 1 T PO QD  3     METFORMIN HCL PO Take 1,000 mg by mouth 2 times daily (with meals).       ticagrelor (BRILINTA) 90 MG tablet Take 1 tablet (90 mg) by mouth 2 times daily 180 tablet 3     cyclobenzaprine (FLEXERIL) 10 MG tablet Take 1 tablet (10 mg) by mouth 3 times daily as needed for muscle spasms (Patient not taking: Reported on 6/13/2018) 90 tablet 1     JARDIANCE 10 MG TABS tablet TK 1 T PO QD  3     lisinopril (PRINIVIL/ZESTRIL) 5 MG tablet Take 3 tablets (15 mg) by mouth daily (Patient not taking: Reported on 1/3/2019) 90 tablet 11     metaxalone (SKELAXIN) 800 MG tablet Take 1 tablet (800 mg) by mouth 3 times daily as needed for moderate pain (Patient not taking: Reported on 6/13/2018) 30 tablet 1     methylPREDNISolone (MEDROL DOSEPAK) 4 MG tablet Follow package instructions (Patient not taking: Reported on 6/13/2018) 21 tablet 0     nicotine (NICODERM CQ) 14 MG/24HR 24 hr patch Place 1 patch onto the skin daily (Patient not taking: Reported on 6/13/2018) 30 patch 0     oxyCODONE (ROXICODONE) 5 MG immediate release  "tablet Take 1 tablet (5 mg) by mouth every 6 hours as needed (Patient not taking: Reported on 1/3/2019) 20 tablet 0     prochlorperazine (COMPAZINE) 25 MG suppository Place 1 suppository (25 mg) rectally every 12 hours as needed for nausea (Patient not taking: Reported on 6/13/2018) 20 suppository 1        ALLERGIES:   No Known Allergies     FAMILY HISTORY:     Family History   Problem Relation Age of Onset     Diabetes Father      Cancer Father         lung and liver        SOCIAL HISTORY:     Social History     Social History     Marital status: Single     Spouse name: N/A     Number of children: N/A     Years of education: N/A     Occupational History     manager      Social History Main Topics     Smoking status: Former Smoker     Years: 0.00     Types: Cigarettes     Quit date: 10/11/2017     Smokeless tobacco: Never Used     Alcohol use Yes      Comment: States occ     Drug use: No     Sexual activity: Yes     Partners: Female     Other Topics Concern     Not on file     Social History Narrative        REVIEW OF SYSTEMS:     Constitutional: No fevers or chills  Skin: No new rash or itching  Eyes: No acute change in vision  Ears/Nose/Throat: No purulent rhinorrhea, new hearing loss, or new vertigo  Respiratory: No cough or hemoptysis  Cardiovascular: See HPI  Gastrointestinal: No change in appetite, vomiting, hematemesis or diarrhea  Genitourinary: No dysuria or hematuria  Musculoskeletal: No new back pain, neck pain or muscle pain  Neurologic: No new headaches, focal weakness or behavior changes  Psychiatric: No hallucinations, excessive alcohol consumption or illegal drug usage  Hematologic/Lymphatic/Immunologic: No bleeding, chills, fever, night sweats or weight loss  Endocrine: No new cold intolerance, heat intolerance, polyphagia, polydipsia or polyuria      PHYSICAL EXAMINATION:     /79 (BP Location: Right arm, Patient Position: Chair, Cuff Size: Adult Large)   Pulse 91   Ht 1.791 m (5' 10.5\")   " Wt 125.2 kg (276 lb)   SpO2 95%   BMI 39.04 kg/m      GENERAL: No acute distress.  HEENT: EOMI. Sclerae white, not injected. Nares clear. Pharynx without erythema or exudate.   Neck: No adenopathy. No thyromegaly. Symmetrical.   Heart: Regular rate and rhythm. No murmurs, rubs, gallops. Normal S1S2  Lungs: Clear to auscultation. No ronchi, wheezes, rales.   Abdomen: Soft, nontender, nondistended. Bowel sounds present.  Extremities: No clubbing, cyanosis, or edema.   Neurologic: Alert and oriented to person/place/time, normal speech and affect. No focal deficits.  Skin: No petechiae, purpura or rash.     LABORATORY DATA:     LIPID RESULTS:  Lab Results   Component Value Date    CHOL 143 06/13/2018    HDL 42 06/13/2018    LDL 56 06/13/2018    TRIG 226 (H) 06/13/2018    CHOLHDLRATIO 3.9 03/31/2012       LIVER ENZYME RESULTS:  Lab Results   Component Value Date    AST 21 08/05/2016    ALT 38 08/05/2016       CBC RESULTS:  Lab Results   Component Value Date    WBC 11.6 (H) 10/14/2017    RBC 4.76 10/14/2017    HGB 15.3 10/14/2017    HCT 45.6 10/14/2017    MCV 96 10/14/2017    MCH 32.1 10/14/2017    MCHC 33.6 10/14/2017    RDW 13.1 10/14/2017     10/14/2017       BMP RESULTS:  Lab Results   Component Value Date     10/14/2017    POTASSIUM 3.8 10/14/2017    CHLORIDE 105 10/14/2017    CO2 21 10/14/2017    ANIONGAP 14 10/14/2017     (H) 10/14/2017    BUN 16 10/14/2017    CR 0.61 (L) 10/14/2017    GFRESTIMATED >90 10/14/2017    GFRESTBLACK >90 10/14/2017    MACHO 8.6 10/14/2017        A1C RESULTS:  Lab Results   Component Value Date    A1C 8.5 (H) 10/14/2017       INR RESULTS:  Lab Results   Component Value Date    INR 1.05 04/03/2012    INR 0.97 05/07/2007          PROCEDURES & FURTHER ASSESSMENTS:     ECG: post LAD LADONNA showed SR with resolution of ST elevation changes    Echocardiogram: 10/13/2017: Technically difficult study.Extremely poor acoustic windows.  Left ventricular size is normal.  Biplane LV  EF 44%.  Large LAD territory akinesis.  The inferior vena cava is normal.  Previous study not available for comparison.         CLINICAL IMPRESSION:     Anselmo Gresham is a 53 year old male with hx tobacco use, HTN, HLD, prior tobacco use, IDDM who presented on 10/13/2017 with jaw pain and found to have anterior STEMI s/p mLAD LADONNA x2 and found to have mild ischemic cardiomyopathy with LVEF 44%.   EF has since recovered to 60-65%.    1. CAD- doing well and motivated   Will continue high intensity statin and Asprin (can stop aspirin for epidural injections if needed)  Continue ACE-I and beta blocker for at least 5 years.  Encouraged to exercise more and monitor his weight  Commended on having quit smoking  Stop ticagrelor.    RTC PRN    Patient and staffed with Dr Connors.    Deangelo Soria  Interventional Cardiology Fellow       Patient seen and examined with Cardiovascular fellow and agree with the assessment and plan described above.     Rishi Connors M.D.  Interventional Cardiology  [facility]

## 2019-01-03 NOTE — NURSING NOTE
Chief Complaint   Patient presents with     Follow Up     6mo follow up.     Medications reviewed and vitals performed.  Vicki Paredes CMA

## 2019-01-03 NOTE — LETTER
1/3/2019      RE: Anselmo Gresham  [address] [place]  [facility] [address]       Dear Colleague,    Thank you for the opportunity to participate in the care of your patient, Anselmo Gresham, at the [facility]. Please see a copy of my visit note below.    CARDIOVASCULAR DIVISION    CARDIOLOGY CLINIC NOTE    PRIMARY CARDIOLOGIST: Dr. Connors      PERTINENT CLINICAL HISTORY:     Anselmo Gresham is a very pleasant 53 year old male with hx prior tobacco use, HTN, HLD, IDDM who had anterior STEMI on 10/13/2017.  He had 2 LADONNA to mLAD and TTE showed LVEF 44% with anterior wall motion abnormality.  He is on good medical management and has been compliant with his aspirin, ticagrelor, high intensity statin, ACE-I and beta blocker.  Since his LADONNA, he has felt well.  He quit smoking and he has been very conscientious of his nutritional intake where he now eats baked foods that are low fat and low salt.  He did go to cardiac rehab once and felt that he could do exercises on his own so he has been walking 3-5x/week on his own for about 30 minutes.  He understands he needs to take better care of himself and is willing to put in the work since he let himself go over last few years due to a hard divorce.    He denies any further chest/shoulder discomfort, lightheadedness, dizziness, ROSEN, SOB.      He did go to ED about a week ago for back pain/spinal stenosis.  He was give prednisone pack and was scheduled for an epidural injection this coming week.  He did notice that his blood sugars increased to the 300s when he was on short steroid burst.         PAST MEDICAL HISTORY:     Past Medical History:   Diagnosis Date     Diabetes mellitus (H)      Dyspepsia and other specified disorders of function of stomach      Myocardial infarction (H)     10/12/2017     Obesity      Unspecified sleep apnea         PAST SURGICAL HISTORY:     Past Surgical History:   Procedure Laterality Date      C ANESTH,REPAIR LO HERNIA VENTR/INCIS       CARDIAC SURGERY      2 stents placed     HC UGI ENDOSCOPY W EUS  4/2/2012    Procedure:COMBINED ENDOSCOPIC ULTRASOUND, ESOPHAGOSCOPY, GASTROSCOPY, DUODENOSCOPY (EGD); Surgeon:RACHEL DIAZ; Location:[facility]     [facility] UGI ENDOSCOPY W EUS  12/17/2013    Procedure: COMBINED ENDOSCOPIC ULTRASOUND, ESOPHAGOSCOPY, GASTROSCOPY, DUODENOSCOPY (EGD);  Surgeon: Rachel Diaz MD;  Location: [facility]     HEMORRHOIDECTOMY,EXTERNAL       LAPAROSCOPIC CHOLECYSTECTOMY  4/3/2012    Procedure:LAPAROSCOPIC CHOLECYSTECTOMY; Laparoscopic Cholecystectomy ; Surgeon:LESIA RODRIGUEZ; Location[facility]        CURRENT MEDICATIONS:     Current Outpatient Medications   Medication Sig Dispense Refill     aspirin (ASA) 81 MG EC tablet Take 1 tablet (81 mg) by mouth daily 90 tablet 3     atorvastatin (LIPITOR) 80 MG tablet Take 1 tablet (80 mg) by mouth daily 90 tablet 3     carvedilol (COREG) 12.5 MG tablet Take 1 tablet (12.5 mg) by mouth 2 times daily 180 tablet 3     carvedilol (COREG) 12.5 MG tablet Take 1 tablet (12.5 mg) by mouth 2 times daily 180 tablet 3     GLIPIZIDE PO Take by mouth 2 times daily (before meals)       losartan (COZAAR) 50 MG tablet TK 1 T PO QD  3     METFORMIN HCL PO Take 1,000 mg by mouth 2 times daily (with meals).       ticagrelor (BRILINTA) 90 MG tablet Take 1 tablet (90 mg) by mouth 2 times daily 180 tablet 3     cyclobenzaprine (FLEXERIL) 10 MG tablet Take 1 tablet (10 mg) by mouth 3 times daily as needed for muscle spasms (Patient not taking: Reported on 6/13/2018) 90 tablet 1     JARDIANCE 10 MG TABS tablet TK 1 T PO QD  3     lisinopril (PRINIVIL/ZESTRIL) 5 MG tablet Take 3 tablets (15 mg) by mouth daily (Patient not taking: Reported on 1/3/2019) 90 tablet 11     metaxalone (SKELAXIN) 800 MG tablet Take 1 tablet (800 mg) by mouth 3 times daily as needed for moderate pain (Patient not taking: Reported on 6/13/2018) 30 tablet 1     methylPREDNISolone  (MEDROL DOSEPAK) 4 MG tablet Follow package instructions (Patient not taking: Reported on 6/13/2018) 21 tablet 0     nicotine (NICODERM CQ) 14 MG/24HR 24 hr patch Place 1 patch onto the skin daily (Patient not taking: Reported on 6/13/2018) 30 patch 0     oxyCODONE (ROXICODONE) 5 MG immediate release tablet Take 1 tablet (5 mg) by mouth every 6 hours as needed (Patient not taking: Reported on 1/3/2019) 20 tablet 0     prochlorperazine (COMPAZINE) 25 MG suppository Place 1 suppository (25 mg) rectally every 12 hours as needed for nausea (Patient not taking: Reported on 6/13/2018) 20 suppository 1        ALLERGIES:   No Known Allergies     FAMILY HISTORY:     Family History   Problem Relation Age of Onset     Diabetes Father      Cancer Father         lung and liver        SOCIAL HISTORY:     Social History     Social History     Marital status: Single     Spouse name: N/A     Number of children: N/A     Years of education: N/A     Occupational History     manager      Social History Main Topics     Smoking status: Former Smoker     Years: 0.00     Types: Cigarettes     Quit date: 10/11/2017     Smokeless tobacco: Never Used     Alcohol use Yes      Comment: States occ     Drug use: No     Sexual activity: Yes     Partners: Female     Other Topics Concern     Not on file     Social History Narrative        REVIEW OF SYSTEMS:     Constitutional: No fevers or chills  Skin: No new rash or itching  Eyes: No acute change in vision  Ears/Nose/Throat: No purulent rhinorrhea, new hearing loss, or new vertigo  Respiratory: No cough or hemoptysis  Cardiovascular: See HPI  Gastrointestinal: No change in appetite, vomiting, hematemesis or diarrhea  Genitourinary: No dysuria or hematuria  Musculoskeletal: No new back pain, neck pain or muscle pain  Neurologic: No new headaches, focal weakness or behavior changes  Psychiatric: No hallucinations, excessive alcohol consumption or illegal drug usage  Hematologic/Lymphatic/Immunologic:  "No bleeding, chills, fever, night sweats or weight loss  Endocrine: No new cold intolerance, heat intolerance, polyphagia, polydipsia or polyuria      PHYSICAL EXAMINATION:     /79 (BP Location: Right arm, Patient Position: Chair, Cuff Size: Adult Large)   Pulse 91   Ht 1.791 m (5' 10.5\")   Wt 125.2 kg (276 lb)   SpO2 95%   BMI 39.04 kg/m       GENERAL: No acute distress.  HEENT: EOMI. Sclerae white, not injected. Nares clear. Pharynx without erythema or exudate.   Neck: No adenopathy. No thyromegaly. Symmetrical.   Heart: Regular rate and rhythm. No murmurs, rubs, gallops. Normal S1S2  Lungs: Clear to auscultation. No ronchi, wheezes, rales.   Abdomen: Soft, nontender, nondistended. Bowel sounds present.  Extremities: No clubbing, cyanosis, or edema.   Neurologic: Alert and oriented to person/place/time, normal speech and affect. No focal deficits.  Skin: No petechiae, purpura or rash.     LABORATORY DATA:     LIPID RESULTS:  Lab Results   Component Value Date    CHOL 143 06/13/2018    HDL 42 06/13/2018    LDL 56 06/13/2018    TRIG 226 (H) 06/13/2018    CHOLHDLRATIO 3.9 03/31/2012       LIVER ENZYME RESULTS:  Lab Results   Component Value Date    AST 21 08/05/2016    ALT 38 08/05/2016       CBC RESULTS:  Lab Results   Component Value Date    WBC 11.6 (H) 10/14/2017    RBC 4.76 10/14/2017    HGB 15.3 10/14/2017    HCT 45.6 10/14/2017    MCV 96 10/14/2017    MCH 32.1 10/14/2017    MCHC 33.6 10/14/2017    RDW 13.1 10/14/2017     10/14/2017       BMP RESULTS:  Lab Results   Component Value Date     10/14/2017    POTASSIUM 3.8 10/14/2017    CHLORIDE 105 10/14/2017    CO2 21 10/14/2017    ANIONGAP 14 10/14/2017     (H) 10/14/2017    BUN 16 10/14/2017    CR 0.61 (L) 10/14/2017    GFRESTIMATED >90 10/14/2017    GFRESTBLACK >90 10/14/2017    MACHO 8.6 10/14/2017        A1C RESULTS:  Lab Results   Component Value Date    A1C 8.5 (H) 10/14/2017       INR RESULTS:  Lab Results   Component Value " Date    INR 1.05 04/03/2012    INR 0.97 05/07/2007          PROCEDURES & FURTHER ASSESSMENTS:     ECG: post LAD LADONNA showed SR with resolution of ST elevation changes    Echocardiogram: 10/13/2017: Technically difficult study.Extremely poor acoustic windows.  Left ventricular size is normal.  Biplane LV EF 44%.  Large LAD territory akinesis.  The inferior vena cava is normal.  Previous study not available for comparison.         CLINICAL IMPRESSION:     Anselmo Gresham is a 53 year old male with hx tobacco use, HTN, HLD, prior tobacco use, IDDM who presented on 10/13/2017 with jaw pain and found to have anterior STEMI s/p mLAD LADONNA x2 and found to have mild ischemic cardiomyopathy with LVEF 44%.   EF has since recovered to 60-65%.    1. CAD- doing well and motivated   Will continue high intensity statin and Asprin (can stop aspirin for epidural injections if needed)  Continue ACE-I and beta blocker for at least 5 years.  Encouraged to exercise more and monitor his weight  Commended on having quit smoking  Stop ticagrelor.    RTC PRN    Patient and staffed with Dr Connors.    Deangelo Soria  Interventional Cardiology Fellow       Patient seen and examined with Cardiovascular fellow and agree with the assessment and plan described above.     Rishi Connors M.D.  Interventional Cardiology  [facility]

## 2019-01-03 NOTE — PATIENT INSTRUCTIONS
You were seen today in the Cardiovascular Clinic at the [facility].      Cardiology Providers you saw during your visit:  Dr. Connors     Recommendations: Please stop your Ticagrelor(Brilinta).    Follow-up: In 1 yr with JOHN here in clinic or with your primary care provider as needed for medication refills.    Thank you for your visit today!     Renetta Gage RN  Structural Heart Care Coordinator   [facility], [employer] and Watchman Programs  [facility]  Office: [phone]    [facility]  [address] [place] [address]  Cardiology Clinic [id number]  [place] [address]

## 2019-01-04 NOTE — NURSING NOTE
Med Reconcile: Reviewed and verified all current medications with the patient. The updated medication list was printed and given to the patient.  Return Appointment: Patient given instructions regarding scheduling next clinic visit. Patient demonstrated understanding of this information and agreed to call with further questions or concerns.  Medication Change: Patient was educated regarding prescribed medication change, including discussion of the indication, administration, side effects, and when to report to MD or RN. Patient demonstrated understanding of this information and agreed to call with further questions or concerns.  Patient stated he understood all health information given and agreed to call with further questions or concerns.    Cardiology Providers you saw during your visit:  Dr. Connors     Recommendations: Please stop your Ticagrelor(Brilinta).    Follow-up: In 1 yr with JOHN here in clinic or with your primary care provider as needed for medication refills.

## 2019-10-11 DIAGNOSIS — I21.3 ST ELEVATION MYOCARDIAL INFARCTION (STEMI), UNSPECIFIED ARTERY (H): ICD-10-CM

## 2019-10-11 NOTE — TELEPHONE ENCOUNTER
atorvastatin (LIPITOR) 80 MG   Last Written Prescription Date:  11/9/17  Last Fill Quantity: 90,   # refills: 3  Last Office Visit : 1/3/19  Future Office visit:  NONE    Routing refill request to provider for review/approval because:  1/3/19  He is on good medical management and has been compliant with his aspirin, ticagrelor, high intensity statin, ACE-I and beta blocker.        OVER 1 YR RF GAP ??

## 2019-10-31 RX ORDER — ATORVASTATIN CALCIUM 80 MG/1
80 TABLET, FILM COATED ORAL DAILY
Qty: 90 TABLET | Refills: 3 | Status: SHIPPED | OUTPATIENT
Start: 2019-10-31 | End: 2020-11-25

## 2019-11-24 DIAGNOSIS — I21.3 ST ELEVATION MYOCARDIAL INFARCTION (STEMI), UNSPECIFIED ARTERY (H): ICD-10-CM

## 2019-11-25 RX ORDER — CARVEDILOL 12.5 MG/1
12.5 TABLET ORAL 2 TIMES DAILY
Qty: 180 TABLET | Refills: 0 | Status: SHIPPED | OUTPATIENT
Start: 2019-11-25 | End: 2020-02-27

## 2019-12-08 ENCOUNTER — APPOINTMENT (OUTPATIENT)
Dept: CT IMAGING | Facility: CLINIC | Age: 54
End: 2019-12-08
Attending: EMERGENCY MEDICINE
Payer: COMMERCIAL

## 2019-12-08 ENCOUNTER — HOSPITAL ENCOUNTER (EMERGENCY)
Facility: CLINIC | Age: 54
Discharge: HOME | End: 2019-12-08
Attending: EMERGENCY MEDICINE | Admitting: EMERGENCY MEDICINE
Payer: COMMERCIAL

## 2019-12-08 VITALS
OXYGEN SATURATION: 94 % | BODY MASS INDEX: 44.13 KG/M2 | TEMPERATURE: 96.8 F | WEIGHT: 312 LBS | SYSTOLIC BLOOD PRESSURE: 135 MMHG | RESPIRATION RATE: 16 BRPM | HEART RATE: 71 BPM | DIASTOLIC BLOOD PRESSURE: 86 MMHG

## 2019-12-08 DIAGNOSIS — R51.9 ACUTE NONINTRACTABLE HEADACHE, UNSPECIFIED HEADACHE TYPE: ICD-10-CM

## 2019-12-08 LAB
ANION GAP SERPL CALCULATED.3IONS-SCNC: 5 MMOL/L (ref 3–14)
BASOPHILS # BLD AUTO: 0 10E9/L (ref 0–0.2)
BASOPHILS NFR BLD AUTO: 0.2 %
BUN SERPL-MCNC: 17 MG/DL (ref 7–30)
CALCIUM SERPL-MCNC: 8.2 MG/DL (ref 8.5–10.1)
CHLORIDE SERPL-SCNC: 102 MMOL/L (ref 94–109)
CO2 SERPL-SCNC: 29 MMOL/L (ref 20–32)
CREAT SERPL-MCNC: 0.6 MG/DL (ref 0.66–1.25)
DIFFERENTIAL METHOD BLD: NORMAL
EOSINOPHIL # BLD AUTO: 0.2 10E9/L (ref 0–0.7)
EOSINOPHIL NFR BLD AUTO: 2.3 %
ERYTHROCYTE [DISTWIDTH] IN BLOOD BY AUTOMATED COUNT: 12.9 % (ref 10–15)
ERYTHROCYTE [SEDIMENTATION RATE] IN BLOOD BY WESTERGREN METHOD: 18 MM/H (ref 0–20)
GFR SERPL CREATININE-BSD FRML MDRD: >90 ML/MIN/{1.73_M2}
GLUCOSE SERPL-MCNC: 260 MG/DL (ref 70–99)
HCT VFR BLD AUTO: 42.6 % (ref 40–53)
HGB BLD-MCNC: 13.7 G/DL (ref 13.3–17.7)
IMM GRANULOCYTES # BLD: 0 10E9/L (ref 0–0.4)
IMM GRANULOCYTES NFR BLD: 0.4 %
LYMPHOCYTES # BLD AUTO: 2.7 10E9/L (ref 0.8–5.3)
LYMPHOCYTES NFR BLD AUTO: 29.8 %
MCH RBC QN AUTO: 30.4 PG (ref 26.5–33)
MCHC RBC AUTO-ENTMCNC: 32.2 G/DL (ref 31.5–36.5)
MCV RBC AUTO: 95 FL (ref 78–100)
MONOCYTES # BLD AUTO: 0.9 10E9/L (ref 0–1.3)
MONOCYTES NFR BLD AUTO: 9.4 %
NEUTROPHILS # BLD AUTO: 5.3 10E9/L (ref 1.6–8.3)
NEUTROPHILS NFR BLD AUTO: 57.9 %
NRBC # BLD AUTO: 0 10*3/UL
NRBC BLD AUTO-RTO: 0 /100
PLATELET # BLD AUTO: 318 10E9/L (ref 150–450)
POTASSIUM SERPL-SCNC: 3.8 MMOL/L (ref 3.4–5.3)
RBC # BLD AUTO: 4.51 10E12/L (ref 4.4–5.9)
SODIUM SERPL-SCNC: 136 MMOL/L (ref 133–144)
WBC # BLD AUTO: 9.1 10E9/L (ref 4–11)

## 2019-12-08 PROCEDURE — 99284 EMERGENCY DEPT VISIT MOD MDM: CPT | Mod: Z6 | Performed by: EMERGENCY MEDICINE

## 2019-12-08 PROCEDURE — 99284 EMERGENCY DEPT VISIT MOD MDM: CPT | Mod: 25

## 2019-12-08 PROCEDURE — 25000128 H RX IP 250 OP 636: Performed by: EMERGENCY MEDICINE

## 2019-12-08 PROCEDURE — 85652 RBC SED RATE AUTOMATED: CPT | Performed by: EMERGENCY MEDICINE

## 2019-12-08 PROCEDURE — 25000132 ZZH RX MED GY IP 250 OP 250 PS 637: Performed by: EMERGENCY MEDICINE

## 2019-12-08 PROCEDURE — 96361 HYDRATE IV INFUSION ADD-ON: CPT

## 2019-12-08 PROCEDURE — 96374 THER/PROPH/DIAG INJ IV PUSH: CPT

## 2019-12-08 PROCEDURE — 80048 BASIC METABOLIC PNL TOTAL CA: CPT | Performed by: EMERGENCY MEDICINE

## 2019-12-08 PROCEDURE — 25800030 ZZH RX IP 258 OP 636: Performed by: EMERGENCY MEDICINE

## 2019-12-08 PROCEDURE — 96375 TX/PRO/DX INJ NEW DRUG ADDON: CPT

## 2019-12-08 PROCEDURE — 70450 CT HEAD/BRAIN W/O DYE: CPT

## 2019-12-08 PROCEDURE — 85025 COMPLETE CBC W/AUTO DIFF WBC: CPT | Performed by: EMERGENCY MEDICINE

## 2019-12-08 RX ORDER — METOCLOPRAMIDE HYDROCHLORIDE 5 MG/ML
5 INJECTION INTRAMUSCULAR; INTRAVENOUS ONCE
Status: COMPLETED | OUTPATIENT
Start: 2019-12-08 | End: 2019-12-08

## 2019-12-08 RX ORDER — BUTALBITAL, ACETAMINOPHEN AND CAFFEINE 50; 325; 40 MG/1; MG/1; MG/1
1-2 TABLET ORAL EVERY 8 HOURS PRN
Qty: 24 TABLET | Refills: 0 | Status: SHIPPED | OUTPATIENT
Start: 2019-12-08

## 2019-12-08 RX ORDER — BUTALBITAL, ACETAMINOPHEN AND CAFFEINE 50; 325; 40 MG/1; MG/1; MG/1
2 TABLET ORAL ONCE
Status: COMPLETED | OUTPATIENT
Start: 2019-12-08 | End: 2019-12-08

## 2019-12-08 RX ORDER — DIPHENHYDRAMINE HYDROCHLORIDE 50 MG/ML
12.5 INJECTION INTRAMUSCULAR; INTRAVENOUS ONCE
Status: COMPLETED | OUTPATIENT
Start: 2019-12-08 | End: 2019-12-08

## 2019-12-08 RX ORDER — KETOROLAC TROMETHAMINE 30 MG/ML
30 INJECTION, SOLUTION INTRAMUSCULAR; INTRAVENOUS ONCE
Status: COMPLETED | OUTPATIENT
Start: 2019-12-08 | End: 2019-12-08

## 2019-12-08 RX ADMIN — BUTALBITAL, ACETAMINOPHEN, AND CAFFEINE 2 TABLET: 50; 325; 40 TABLET ORAL at 21:27

## 2019-12-08 RX ADMIN — DIPHENHYDRAMINE HYDROCHLORIDE 12.5 MG: 50 INJECTION, SOLUTION INTRAMUSCULAR; INTRAVENOUS at 19:04

## 2019-12-08 RX ADMIN — METOCLOPRAMIDE 5 MG: 5 INJECTION, SOLUTION INTRAMUSCULAR; INTRAVENOUS at 19:08

## 2019-12-08 RX ADMIN — KETOROLAC TROMETHAMINE 30 MG: 30 INJECTION, SOLUTION INTRAMUSCULAR; INTRAVENOUS at 20:20

## 2019-12-08 RX ADMIN — SODIUM CHLORIDE 1000 ML: 9 INJECTION, SOLUTION INTRAVENOUS at 19:03

## 2019-12-08 ASSESSMENT — ENCOUNTER SYMPTOMS
NUMBNESS: 0
WEAKNESS: 0
VOMITING: 0
NAUSEA: 0
FEVER: 0
HEADACHES: 1

## 2019-12-08 NOTE — ED AVS SNAPSHOT
[facility], [place], Emergency Department  [address]  [place] [address]  Phone:  [phone]  Fax:  [phone]                                    Anselmo Gresham   MRN: [id number]    Department:  [facility], Emergency Department   Date of Visit:  12/8/2019           After Visit Summary Signature Page    I have received my discharge instructions, and my questions have been answered. I have discussed any challenges I see with this plan with the nurse or doctor.    ..........................................................................................................................................  Patient/Patient Representative Signature      ..........................................................................................................................................  Patient Representative Print Name and Relationship to Patient    ..................................................               ................................................  Date                                   Time    ..........................................................................................................................................  Reviewed by Signature/Title    ...................................................              ..............................................  Date                                               Time          22EPIC Rev 08/18

## 2019-12-09 NOTE — DISCHARGE INSTRUCTIONS
Home tonight to get some sleep.    Follow-up with your primary care clinic tomorrow or return to the ER tomorrow should your symptoms not be improved.    Please make an appointment to follow up with Your Primary Care Provider in 1 week for recheck.

## 2019-12-09 NOTE — ED PROVIDER NOTES
[facility] EMERGENCY DEPARTMENT ([facility])    12/08/19       History     Chief Complaint   Patient presents with     Headache     sharp right sided severe headache.     HPI  Anselmo Gresham is a 54 year old male who states that he woke up today about 3:30PM, 3 hours ago with the worst headache of his life over his right temporal parietal area.  Patient denies any photophobia, blurriness of vision, denies any nausea, denies any new focal numbness or weakness and states that he has some old sciatica but no new focal neurologic symptoms.  Patient states that this is the worst headache he has ever had and has no history of migraine headaches in the past.  Patient states that he has had no nausea or vomiting, no fevers and states that he has not gotten a lot of sleep lately.  Patient states he has been having friends in town and last night did not get the bed until 3 AM and had to be up at 6AM to go to work this morning.  Patient states he got home from work and laid down for an hour but then woke up at 3:30PM today with this headache.    This part of the medical record was transcribed by Fei Hamilton Medical Scribe.     I have reviewed the Medications, Allergies, Past Medical and Surgical History, and Social History in the [employer] system.    Past Medical History:   Diagnosis Date     Diabetes mellitus (H)      Dyspepsia and other specified disorders of function of stomach      Myocardial infarction (H)     10/12/2017     Obesity      Unspecified sleep apnea        Past Surgical History:   Procedure Laterality Date     C ANESTH,REPAIR LO HERNIA VENTR/INCIS       CARDIAC SURGERY      2 stents placed     HC UGI ENDOSCOPY W EUS  4/2/2012    Procedure:COMBINED ENDOSCOPIC ULTRASOUND, ESOPHAGOSCOPY, GASTROSCOPY, DUODENOSCOPY (EGD); Surgeon:ALANA LEGGETT; Location: GI     HC UGI ENDOSCOPY W EUS  12/17/2013    Procedure: COMBINED ENDOSCOPIC ULTRASOUND, ESOPHAGOSCOPY, GASTROSCOPY, DUODENOSCOPY (EGD);  Surgeon:  Rachel Diaz MD;  Location:  GI     HEMORRHOIDECTOMY,EXTERNAL       LAPAROSCOPIC CHOLECYSTECTOMY  4/3/2012    Procedure:LAPAROSCOPIC CHOLECYSTECTOMY; Laparoscopic Cholecystectomy ; Surgeon:LESIA RODRIGUEZ; Location: OR       Family History   Problem Relation Age of Onset     Diabetes Father      Cancer Father         lung and liver       Social History     Tobacco Use     Smoking status: Former Smoker     Years: 0.00     Types: Cigarettes     Last attempt to quit: 10/11/2017     Years since quittin.1     Smokeless tobacco: Never Used   Substance Use Topics     Alcohol use: Yes     Comment: States occ        Dose / Directions   aspirin 81 MG EC tablet  Commonly known as:  ASA  Used for:  ST elevation myocardial infarction (STEMI), unspecified artery (H)      Dose:  81 mg  Take 1 tablet (81 mg) by mouth daily  Quantity:  90 tablet  Refills:  3     atorvastatin 80 MG tablet  Commonly known as:  LIPITOR  Used for:  ST elevation myocardial infarction (STEMI), unspecified artery (H)      Dose:  80 mg  Take 1 tablet (80 mg) by mouth daily  Quantity:  90 tablet  Refills:  3     * carvedilol 12.5 MG tablet  Commonly known as:  COREG  Used for:  ST elevation myocardial infarction (STEMI), unspecified artery (H)      Dose:  12.5 mg  Take 1 tablet (12.5 mg) by mouth 2 times daily  Quantity:  180 tablet  Refills:  3     * carvedilol 12.5 MG tablet  Commonly known as:  COREG  Used for:  ST elevation myocardial infarction (STEMI), unspecified artery (H)      Dose:  12.5 mg  Take 1 tablet (12.5 mg) by mouth 2 times daily  Quantity:  180 tablet  Refills:  0     cyclobenzaprine 10 MG tablet  Commonly known as:  FLEXERIL      Dose:  10 mg  Take 1 tablet (10 mg) by mouth 3 times daily as needed for muscle spasms  Quantity:  90 tablet  Refills:  1     GLIPIZIDE PO      Take by mouth 2 times daily (before meals)  Refills:  0     Jardiance 10 MG Tabs tablet  Generic drug:  empagliflozin      TK 1 T PO  QD  Refills:  3     lisinopril 5 MG tablet  Commonly known as:  PRINIVIL/ZESTRIL  Used for:  ST elevation myocardial infarction (STEMI), unspecified artery (H)      Dose:  15 mg  Take 3 tablets (15 mg) by mouth daily  Quantity:  90 tablet  Refills:  11     losartan 50 MG tablet  Commonly known as:  COZAAR      TK 1 T PO QD  Refills:  3     metaxalone 800 MG tablet  Commonly known as:  SKELAXIN      Dose:  800 mg  Take 1 tablet (800 mg) by mouth 3 times daily as needed for moderate pain  Quantity:  30 tablet  Refills:  1     METFORMIN HCL PO      Dose:  1,000 mg  Take 1,000 mg by mouth 2 times daily (with meals).  Refills:  0     methylPREDNISolone 4 MG tablet therapy pack  Commonly known as:  MEDROL DOSEPAK      Follow package instructions  Quantity:  21 tablet  Refills:  0     nicotine 14 MG/24HR 24 hr patch  Commonly known as:  NICODERM CQ  Used for:  ST elevation myocardial infarction (STEMI), unspecified artery (H), Tobacco abuse      Dose:  1 patch  Place 1 patch onto the skin daily  Quantity:  30 patch  Refills:  0     oxyCODONE 5 MG tablet  Commonly known as:  ROXICODONE  Used for:  Acute pancreatitis      Dose:  5 mg  Take 1 tablet (5 mg) by mouth every 6 hours as needed  Quantity:  20 tablet  Refills:  0     prochlorperazine 25 MG suppository  Commonly known as:  COMPAZINE      Dose:  25 mg  Place 1 suppository (25 mg) rectally every 12 hours as needed for nausea  Quantity:  20 suppository  Refills:  1            No Known Allergies     Review of Systems   Constitutional: Negative for fever.   Gastrointestinal: Negative for nausea and vomiting.   Neurological: Positive for headaches. Negative for weakness and numbness.   All other systems reviewed and are negative.      Physical Exam   BP: (!) 140/86  Pulse: 87  Temp: 96.8  F (36  C)  Resp: 18  Weight: 141.5 kg (312 lb)  SpO2: 97 %      Physical Exam  Vitals signs and nursing note reviewed.   Constitutional:       Comments: Conversant but in some mild  distress secondary to his headache   HENT:      Head: Atraumatic.      Mouth/Throat:      Mouth: Mucous membranes are moist.      Pharynx: No oropharyngeal exudate.   Eyes:      Extraocular Movements: Extraocular movements intact.      Pupils: Pupils are equal, round, and reactive to light.   Neck:      Musculoskeletal: Neck supple. No neck rigidity.   Cardiovascular:      Rate and Rhythm: Regular rhythm.   Pulmonary:      Breath sounds: Normal breath sounds.   Abdominal:      Palpations: Abdomen is soft.      Tenderness: There is no abdominal tenderness.   Musculoskeletal:         General: No tenderness.   Skin:     General: Skin is warm.   Neurological:      General: No focal deficit present.      Mental Status: He is alert and oriented to person, place, and time.      Cranial Nerves: No cranial nerve deficit.      Motor: No weakness.   Psychiatric:         Mood and Affect: Mood normal.         ED Course     IV was initiated and patient was given medications initially for a migraine headache.    Results for orders placed or performed during the hospital encounter of 12/08/19   Basic metabolic panel     Status: Abnormal   Result Value Ref Range    Sodium 136 133 - 144 mmol/L    Potassium 3.8 3.4 - 5.3 mmol/L    Chloride 102 94 - 109 mmol/L    Carbon Dioxide 29 20 - 32 mmol/L    Anion Gap 5 3 - 14 mmol/L    Glucose 260 (H) 70 - 99 mg/dL    Urea Nitrogen 17 7 - 30 mg/dL    Creatinine 0.60 (L) 0.66 - 1.25 mg/dL    GFR Estimate >90 >60 mL/min/[1.73_m2]    GFR Estimate If Black >90 >60 mL/min/[1.73_m2]    Calcium 8.2 (L) 8.5 - 10.1 mg/dL   CBC with platelets differential     Status: None   Result Value Ref Range    WBC 9.1 4.0 - 11.0 10e9/L    RBC Count 4.51 4.4 - 5.9 10e12/L    Hemoglobin 13.7 13.3 - 17.7 g/dL    Hematocrit 42.6 40.0 - 53.0 %    MCV 95 78 - 100 fl    MCH 30.4 26.5 - 33.0 pg    MCHC 32.2 31.5 - 36.5 g/dL    RDW 12.9 10.0 - 15.0 %    Platelet Count 318 150 - 450 10e9/L    Diff Method Automated Method      % Neutrophils 57.9 %    % Lymphocytes 29.8 %    % Monocytes 9.4 %    % Eosinophils 2.3 %    % Basophils 0.2 %    % Immature Granulocytes 0.4 %    Nucleated RBCs 0 0 /100    Absolute Neutrophil 5.3 1.6 - 8.3 10e9/L    Absolute Lymphocytes 2.7 0.8 - 5.3 10e9/L    Absolute Monocytes 0.9 0.0 - 1.3 10e9/L    Absolute Eosinophils 0.2 0.0 - 0.7 10e9/L    Absolute Basophils 0.0 0.0 - 0.2 10e9/L    Abs Immature Granulocytes 0.0 0 - 0.4 10e9/L    Absolute Nucleated RBC 0.0    Erythrocyte sedimentation rate auto     Status: None   Result Value Ref Range    Sed Rate 18 0 - 20 mm/h     Head CT was performed and revealed no evidence of acute intracranial pathology and this was well within the 6-hour window to rule out subarachnoid hemorrhage.           Procedures            Labs Ordered and Resulted from Time of ED Arrival Up to the Time of Departure from the ED   BASIC METABOLIC PANEL - Abnormal; Notable for the following components:       Result Value    Glucose 260 (*)     Creatinine 0.60 (*)     Calcium 8.2 (*)     All other components within normal limits   CBC WITH PLATELETS DIFFERENTIAL   ERYTHROCYTE SEDIMENTATION RATE AUTO   PERIPHERAL IV CATHETER       Assessments & Plan (with Medical Decision Making)     I have reviewed the nursing notes.    Medications   0.9% sodium chloride BOLUS (0 mLs Intravenous Stopped 12/8/19 2003)   diphenhydrAMINE (BENADRYL) injection 12.5 mg (12.5 mg Intravenous Given 12/8/19 1904)   metoclopramide (REGLAN) injection 5 mg (5 mg Intravenous Given 12/8/19 1908)   ketorolac (TORADOL) injection 30 mg (30 mg Intravenous Given 12/8/19 2020)   butalbital-acetaminophen-caffeine (FIORICET/ESGIC) per tablet 2 tablet (2 tablets Oral Given 12/8/19 2127)     Patient reported no relief with the Reglan and Benadryl and IV fluids.  Patient was then subsequently given Toradol and given Fioricet.  Patient at this time will be sent home.  Patient clinically has improved but still has some of his headache.  The  etiology of the headache is uncertain but I believe that a role of sleep deprivation has played into it and that it may be a severe tension headache.  The headache did not change at all with Reglan and Benadryl and the patient has no history of previous migraines.    I have reviewed the findings, diagnosis, plan and need for follow up with the patient.    New Prescriptions    BUTALBITAL-ACETAMINOPHEN-CAFFEINE (FIORICET/ESGIC) -40 MG TABLET    Take 1-2 tablets by mouth every 8 hours as needed for headaches       Final diagnoses:   Acute nonintractable headache, unspecified headache type     Home tonight to get some sleep.    Follow-up with your primary care clinic tomorrow or return to the ER tomorrow should your symptoms not be improved.    Please make an appointment to follow up with Your Primary Care Provider in 1 week for recheck.    Routine discharged instructions were given for this diagnosis    Rj Herrera MD, MD    12/8/2019   [facility], [place], EMERGENCY DEPARTMENT     Rj Herrera MD  12/08/19 [id number]

## 2020-02-25 DIAGNOSIS — I21.3 ST ELEVATION MYOCARDIAL INFARCTION (STEMI), UNSPECIFIED ARTERY (H): Primary | ICD-10-CM

## 2020-02-27 RX ORDER — CARVEDILOL 12.5 MG/1
12.5 TABLET ORAL 2 TIMES DAILY
Qty: 180 TABLET | Refills: 0 | Status: SHIPPED | OUTPATIENT
Start: 2020-02-27

## 2020-11-23 DIAGNOSIS — I21.3 ST ELEVATION MYOCARDIAL INFARCTION (STEMI), UNSPECIFIED ARTERY (H): ICD-10-CM

## 2020-12-01 RX ORDER — ATORVASTATIN CALCIUM 80 MG/1
80 TABLET, FILM COATED ORAL DAILY
Qty: 90 TABLET | Refills: 3 | Status: SHIPPED | OUTPATIENT
Start: 2020-12-01

## 2021-08-28 ENCOUNTER — HOSPITAL ENCOUNTER (EMERGENCY)
Facility: CLINIC | Age: 56
Discharge: HOME | End: 2021-08-28
Attending: FAMILY MEDICINE | Admitting: FAMILY MEDICINE
Payer: COMMERCIAL

## 2021-08-28 ENCOUNTER — APPOINTMENT (OUTPATIENT)
Dept: GENERAL RADIOLOGY | Facility: CLINIC | Age: 56
End: 2021-08-28
Attending: FAMILY MEDICINE
Payer: COMMERCIAL

## 2021-08-28 ENCOUNTER — HOSPITAL ENCOUNTER (EMERGENCY)
Facility: CLINIC | Age: 56
Discharge: LEFT BEFORE DISCHARGE | End: 2021-08-28
Payer: COMMERCIAL

## 2021-08-28 ENCOUNTER — APPOINTMENT (OUTPATIENT)
Dept: CT IMAGING | Facility: CLINIC | Age: 56
End: 2021-08-28
Attending: FAMILY MEDICINE
Payer: COMMERCIAL

## 2021-08-28 VITALS
HEART RATE: 82 BPM | SYSTOLIC BLOOD PRESSURE: 168 MMHG | RESPIRATION RATE: 18 BRPM | DIASTOLIC BLOOD PRESSURE: 104 MMHG | TEMPERATURE: 98.9 F | OXYGEN SATURATION: 96 %

## 2021-08-28 VITALS
BODY MASS INDEX: 41.02 KG/M2 | SYSTOLIC BLOOD PRESSURE: 165 MMHG | TEMPERATURE: 97.6 F | OXYGEN SATURATION: 95 % | DIASTOLIC BLOOD PRESSURE: 99 MMHG | RESPIRATION RATE: 18 BRPM | HEART RATE: 93 BPM | WEIGHT: 290 LBS

## 2021-08-28 DIAGNOSIS — R10.13 EPIGASTRIC PAIN: ICD-10-CM

## 2021-08-28 DIAGNOSIS — K85.10 ACUTE BILIARY PANCREATITIS, UNSPECIFIED COMPLICATION STATUS: ICD-10-CM

## 2021-08-28 DIAGNOSIS — K29.70 GASTRITIS WITHOUT BLEEDING, UNSPECIFIED CHRONICITY, UNSPECIFIED GASTRITIS TYPE: ICD-10-CM

## 2021-08-28 LAB
ALBUMIN SERPL-MCNC: 3.2 G/DL (ref 3.4–5)
ALP SERPL-CCNC: 75 U/L (ref 40–150)
ALT SERPL W P-5'-P-CCNC: 33 U/L (ref 0–70)
AMYLASE SERPL-CCNC: 50 U/L (ref 30–110)
ANION GAP SERPL CALCULATED.3IONS-SCNC: 4 MMOL/L (ref 3–14)
AST SERPL W P-5'-P-CCNC: 22 U/L (ref 0–45)
BASOPHILS # BLD AUTO: 0.1 10E3/UL (ref 0–0.2)
BASOPHILS NFR BLD AUTO: 1 %
BILIRUB SERPL-MCNC: 0.4 MG/DL (ref 0.2–1.3)
BUN SERPL-MCNC: 16 MG/DL (ref 7–30)
CALCIUM SERPL-MCNC: 8.7 MG/DL (ref 8.5–10.1)
CHLORIDE BLD-SCNC: 102 MMOL/L (ref 94–109)
CO2 SERPL-SCNC: 31 MMOL/L (ref 20–32)
CREAT BLD-MCNC: 0.6 MG/DL (ref 0.7–1.3)
CREAT SERPL-MCNC: 0.67 MG/DL (ref 0.66–1.25)
EOSINOPHIL # BLD AUTO: 0.2 10E3/UL (ref 0–0.7)
EOSINOPHIL NFR BLD AUTO: 2 %
ERYTHROCYTE [DISTWIDTH] IN BLOOD BY AUTOMATED COUNT: 13.1 % (ref 10–15)
GFR SERPL CREATININE-BSD FRML MDRD: >60 ML/MIN/1.73M2
GFR SERPL CREATININE-BSD FRML MDRD: >90 ML/MIN/1.73M2
GLUCOSE BLD-MCNC: 311 MG/DL (ref 70–99)
HCT VFR BLD AUTO: 44.6 % (ref 40–53)
HGB BLD-MCNC: 14.9 G/DL (ref 13.3–17.7)
IMM GRANULOCYTES # BLD: 0 10E3/UL
IMM GRANULOCYTES NFR BLD: 0 %
LIPASE SERPL-CCNC: 183 U/L (ref 73–393)
LYMPHOCYTES # BLD AUTO: 2.7 10E3/UL (ref 0.8–5.3)
LYMPHOCYTES NFR BLD AUTO: 29 %
MCH RBC QN AUTO: 30.7 PG (ref 26.5–33)
MCHC RBC AUTO-ENTMCNC: 33.4 G/DL (ref 31.5–36.5)
MCV RBC AUTO: 92 FL (ref 78–100)
MONOCYTES # BLD AUTO: 0.7 10E3/UL (ref 0–1.3)
MONOCYTES NFR BLD AUTO: 8 %
NEUTROPHILS # BLD AUTO: 5.5 10E3/UL (ref 1.6–8.3)
NEUTROPHILS NFR BLD AUTO: 60 %
NRBC # BLD AUTO: 0 10E3/UL
NRBC BLD AUTO-RTO: 0 /100
PLATELET # BLD AUTO: 306 10E3/UL (ref 150–450)
POTASSIUM BLD-SCNC: 4 MMOL/L (ref 3.4–5.3)
PROT SERPL-MCNC: 7.5 G/DL (ref 6.8–8.8)
RBC # BLD AUTO: 4.86 10E6/UL (ref 4.4–5.9)
SODIUM SERPL-SCNC: 137 MMOL/L (ref 133–144)
TROPONIN I SERPL-MCNC: <0.015 UG/L (ref 0–0.04)
TROPONIN T BLD-MCNC: 0 UG/L
WBC # BLD AUTO: 9.1 10E3/UL (ref 4–11)

## 2021-08-28 PROCEDURE — 93010 ELECTROCARDIOGRAM REPORT: CPT | Performed by: FAMILY MEDICINE

## 2021-08-28 PROCEDURE — 96376 TX/PRO/DX INJ SAME DRUG ADON: CPT | Performed by: FAMILY MEDICINE

## 2021-08-28 PROCEDURE — 82150 ASSAY OF AMYLASE: CPT | Performed by: FAMILY MEDICINE

## 2021-08-28 PROCEDURE — 250N000013 HC RX MED GY IP 250 OP 250 PS 637: Performed by: FAMILY MEDICINE

## 2021-08-28 PROCEDURE — 82565 ASSAY OF CREATININE: CPT

## 2021-08-28 PROCEDURE — 36415 COLL VENOUS BLD VENIPUNCTURE: CPT | Performed by: FAMILY MEDICINE

## 2021-08-28 PROCEDURE — 84484 ASSAY OF TROPONIN QUANT: CPT | Performed by: FAMILY MEDICINE

## 2021-08-28 PROCEDURE — 71046 X-RAY EXAM CHEST 2 VIEWS: CPT

## 2021-08-28 PROCEDURE — 83690 ASSAY OF LIPASE: CPT | Performed by: FAMILY MEDICINE

## 2021-08-28 PROCEDURE — 85025 COMPLETE CBC W/AUTO DIFF WBC: CPT | Performed by: FAMILY MEDICINE

## 2021-08-28 PROCEDURE — 74177 CT ABD & PELVIS W/CONTRAST: CPT

## 2021-08-28 PROCEDURE — 250N000011 HC RX IP 250 OP 636: Performed by: FAMILY MEDICINE

## 2021-08-28 PROCEDURE — 99285 EMERGENCY DEPT VISIT HI MDM: CPT | Mod: 25 | Performed by: FAMILY MEDICINE

## 2021-08-28 PROCEDURE — 96374 THER/PROPH/DIAG INJ IV PUSH: CPT | Mod: 59 | Performed by: FAMILY MEDICINE

## 2021-08-28 PROCEDURE — 93005 ELECTROCARDIOGRAM TRACING: CPT | Performed by: FAMILY MEDICINE

## 2021-08-28 PROCEDURE — 80053 COMPREHEN METABOLIC PANEL: CPT | Performed by: FAMILY MEDICINE

## 2021-08-28 PROCEDURE — 250N000009 HC RX 250: Performed by: FAMILY MEDICINE

## 2021-08-28 PROCEDURE — 84484 ASSAY OF TROPONIN QUANT: CPT

## 2021-08-28 RX ORDER — OXYCODONE AND ACETAMINOPHEN 5; 325 MG/1; MG/1
1-2 TABLET ORAL EVERY 4 HOURS PRN
Qty: 12 TABLET | Refills: 0 | Status: SHIPPED | OUTPATIENT
Start: 2021-08-28

## 2021-08-28 RX ORDER — HYDROMORPHONE HYDROCHLORIDE 1 MG/ML
0.5 INJECTION, SOLUTION INTRAMUSCULAR; INTRAVENOUS; SUBCUTANEOUS ONCE
Status: COMPLETED | OUTPATIENT
Start: 2021-08-28 | End: 2021-08-28

## 2021-08-28 RX ORDER — IOPAMIDOL 755 MG/ML
100 INJECTION, SOLUTION INTRAVASCULAR ONCE
Status: COMPLETED | OUTPATIENT
Start: 2021-08-28 | End: 2021-08-28

## 2021-08-28 RX ADMIN — LIDOCAINE HYDROCHLORIDE 30 ML: 20 SOLUTION ORAL; TOPICAL at 20:09

## 2021-08-28 RX ADMIN — HYDROMORPHONE HYDROCHLORIDE 0.5 MG: 1 INJECTION, SOLUTION INTRAMUSCULAR; INTRAVENOUS; SUBCUTANEOUS at 20:10

## 2021-08-28 RX ADMIN — HYDROMORPHONE HYDROCHLORIDE 0.5 MG: 1 INJECTION, SOLUTION INTRAMUSCULAR; INTRAVENOUS; SUBCUTANEOUS at 22:37

## 2021-08-28 RX ADMIN — IOPAMIDOL 135 ML: 755 INJECTION, SOLUTION INTRAVENOUS at 20:50

## 2021-08-28 RX ADMIN — SODIUM CHLORIDE 74 ML: 9 INJECTION, SOLUTION INTRAVENOUS at 20:51

## 2021-08-28 NOTE — ED TRIAGE NOTES
Pt presents to the ER with epigastric pain x3days. Pt states he's had pancreatitis before but he is unsure if this is pancreatitis or an ulcer. Pt states pain is worse after eating.    Pt is hypertensive in triage, otherwise VSS.

## 2021-08-29 LAB
ATRIAL RATE - MUSE: 79 BPM
DIASTOLIC BLOOD PRESSURE - MUSE: NORMAL MMHG
INTERPRETATION ECG - MUSE: NORMAL
P AXIS - MUSE: 47 DEGREES
PR INTERVAL - MUSE: 234 MS
QRS DURATION - MUSE: 136 MS
QT - MUSE: 432 MS
QTC - MUSE: 495 MS
R AXIS - MUSE: -59 DEGREES
SYSTOLIC BLOOD PRESSURE - MUSE: NORMAL MMHG
T AXIS - MUSE: 83 DEGREES
VENTRICULAR RATE- MUSE: 79 BPM

## 2021-08-29 NOTE — ED TRIAGE NOTES
Epigastric pain for three days,  No OTC meds taken, Offered ibuprofen and tylenol, pt refused.  Pt denies any dietary changes,  Covid shot last week (#2).  Pt came from [place] and had 6 hour wait time so left and came to this ER.

## 2021-08-29 NOTE — ED PROVIDER NOTES
[facility] EMERGENCY DEPARTMENT ([facility])   August 28, 2021  History     Chief Complaint   Patient presents with     Abdominal Pain     epigastric pain for three days,      HPI  Anselmo Gresham is a 56 year old male with PMH significant for diabetes, obesity, and CAD status post anterior STEMI with 2 stents placed (2017) who presents emergency room with concerns about ongoing epigastric discomfort and whether not this could represent any sort of cardiac versus GI abnormality.  He denies any shortness of breath or cough no fevers noted abdominal pain is primarily epigastric in origin but radiates up into the anterior chest.  Patient notes that the pain does seem to be worse after eating.  Exertion does not seem to have an effect on the pain.  He has had increased episodes over the last 2 to 3 days.      PAST MEDICAL HISTORY:   Past Medical History:   Diagnosis Date     Diabetes mellitus (H)      Dyspepsia and other specified disorders of function of stomach      Myocardial infarction (H)     10/12/2017     Obesity      Unspecified sleep apnea        PAST SURGICAL HISTORY:   Past Surgical History:   Procedure Laterality Date     C ANESTH,REPAIR LO HERNIA VENTR/INCIS       CARDIAC SURGERY      2 stents placed     HC UGI ENDOSCOPY W EUS  4/2/2012    Procedure:COMBINED ENDOSCOPIC ULTRASOUND, ESOPHAGOSCOPY, GASTROSCOPY, DUODENOSCOPY (EGD); Surgeon:RACHEL DIAZ; Location:[facility]     HC UGI ENDOSCOPY W EUS  12/17/2013    Procedure: COMBINED ENDOSCOPIC ULTRASOUND, ESOPHAGOSCOPY, GASTROSCOPY, DUODENOSCOPY (EGD);  Surgeon: Rachel Diaz MD;  Location: [facility]     HEMORRHOIDECTOMY,EXTERNAL       LAPAROSCOPIC CHOLECYSTECTOMY  4/3/2012    Procedure:LAPAROSCOPIC CHOLECYSTECTOMY; Laparoscopic Cholecystectomy ; Surgeon:LESIA RODRIGUEZ; Location:[facility]       Past medical history, past surgical history, medications, and allergies were reviewed with the patient. Additional pertinent items: None    FAMILY  HISTORY:   Family History   Problem Relation Age of Onset     Diabetes Father      Cancer Father         lung and liver       SOCIAL HISTORY:   Social History     Tobacco Use     Smoking status: Former Smoker     Years: 0.00     Types: Cigarettes     Quit date: 10/11/2017     Years since quitting: 3.8     Smokeless tobacco: Never Used   Substance Use Topics     Alcohol use: Yes     Comment: States occ     Social history was reviewed with the patient. Additional pertinent items: None      Discharge Medication List as of 8/28/2021 11:39 PM      START taking these medications    Details   omeprazole (PRILOSEC) 20 MG DR capsule Take 1 capsule (20 mg) by mouth daily, Disp-30 capsule, R-0, Local Print      oxyCODONE-acetaminophen (PERCOCET) 5-325 MG tablet Take 1-2 tablets by mouth every 4 hours as needed for pain, Disp-12 tablet, R-0, Local Print         CONTINUE these medications which have NOT CHANGED    Details   aspirin (ASA) 81 MG EC tablet Take 1 tablet (81 mg) by mouth daily, Disp-90 tablet, R-3, E-Prescribe      atorvastatin (LIPITOR) 80 MG tablet Take 1 tablet (80 mg) by mouth daily *Office Visit and Labs due, Please call [phone] to schedule*   Thank you, Disp-90 tablet, R-3, E-Prescribe      losartan (COZAAR) 50 MG tablet TK 1 T PO QD, R-3, Historical      METFORMIN HCL PO Take 1,000 mg by mouth 2 times daily (with meals)., Historical      oxyCODONE (ROXICODONE) 5 MG immediate release tablet Take 1 tablet (5 mg) by mouth every 6 hours as needed, Disp-20 tablet, R-0, Local Print      butalbital-acetaminophen-caffeine (FIORICET/ESGIC) -40 MG tablet Take 1-2 tablets by mouth every 8 hours as needed for headaches, Disp-24 tablet, R-0, Local Print      !! carvedilol (COREG) 12.5 MG tablet Take 1 tablet (12.5 mg) by mouth 2 times daily, Disp-180 tablet, R-0, E-PrescribePlease remind patient to schedule an appt with the Cardiology Clinic and he can request more refills at that time      !! carvedilol  (COREG) 12.5 MG tablet Take 1 tablet (12.5 mg) by mouth 2 times daily, Disp-180 tablet, R-3, E-Prescribe      cyclobenzaprine (FLEXERIL) 10 MG tablet Take 1 tablet (10 mg) by mouth 3 times daily as needed for muscle spasms, Disp-90 tablet, R-1, Local Print      GLIPIZIDE PO Take by mouth 2 times daily (before meals), Historical      JARDIANCE 10 MG TABS tablet TK 1 T PO QD, R-3, MIKEY, Historical      lisinopril (PRINIVIL/ZESTRIL) 5 MG tablet Take 3 tablets (15 mg) by mouth daily, Disp-90 tablet, R-11, E-Prescribe      metaxalone (SKELAXIN) 800 MG tablet Take 1 tablet (800 mg) by mouth 3 times daily as needed for moderate pain, Disp-30 tablet, R-1, Normal      methylPREDNISolone (MEDROL DOSEPAK) 4 MG tablet Follow package instructions, Disp-21 tablet, R-0, Local Print      nicotine (NICODERM CQ) 14 MG/24HR 24 hr patch Place 1 patch onto the skin daily, Disp-30 patch, R-0, E-Prescribe      prochlorperazine (COMPAZINE) 25 MG suppository Place 1 suppository (25 mg) rectally every 12 hours as needed for nausea, Disp-20 suppository, R-1, Local Print       !! - Potential duplicate medications found. Please discuss with provider.           No Known Allergies     Review of Systems  A complete review of systems was performed with pertinent positives and negatives noted in the HPI, and all other systems negative.    Physical Exam   BP: (!) 165/99  Pulse: 93  Temp: 97.6  F (36.4  C)  Resp: 18  Weight: 131.5 kg (290 lb)  SpO2: 95 %      Physical Exam  Constitutional:       General: He is not in acute distress.     Appearance: He is not diaphoretic.   HENT:      Head: Atraumatic.   Eyes:      General: No scleral icterus.     Pupils: Pupils are equal, round, and reactive to light.   Cardiovascular:      Heart sounds: Normal heart sounds.   Pulmonary:      Effort: No respiratory distress.      Breath sounds: Normal breath sounds.   Abdominal:      General: Bowel sounds are normal.      Palpations: Abdomen is soft.      Tenderness:  There is abdominal tenderness in the epigastric area. There is no right CVA tenderness or left CVA tenderness.   Musculoskeletal:         General: No tenderness.   Skin:     General: Skin is warm.      Findings: No rash.   Neurological:      General: No focal deficit present.      Mental Status: He is oriented to person, place, and time.      Motor: No weakness.      Coordination: Coordination normal.         ED Course        Procedures            EKG Interpretation:      Interpreted by Dominguez Sharp MD  Time reviewed: on arrival  Symptoms at time of EKG: epigastric pain   Rhythm: normal sinus   Rate: Normal  Axis: Normal  Ectopy: none  Conduction: bifasicular  ST Segments/ T Waves: No ST-T wave changes  Q Waves: nonspecific  Comparison to prior: bifasicular block    Clinical Impression: bifasicular block            Results for orders placed or performed during the hospital encounter of 08/28/21   XR Chest 2 Views     Status: None    Narrative    CHEST TWO VIEWS  8/28/2021 8:42 PM     HISTORY:  Abdominal pain.    COMPARISON: 10/13/2017.      Impression    IMPRESSION: Mild elevation of the right hemidiaphragm. Chest otherwise  negative. Lungs clear. No pleural effusions.    KUSH BENEDICT MD         SYSTEM ID:  [id number]   CT Abdomen Pelvis w Contrast     Status: None    Narrative    CT ABDOMEN AND PELVIS WITH CONTRAST 8/28/2021 8:43 PM    CLINICAL HISTORY: Epigastric pain.    TECHNIQUE: CT scan of the abdomen and pelvis was performed following  injection of IV contrast. Multiplanar reformats were obtained. Dose  reduction techniques were used.  CONTRAST: 135mL Isovue 370  COMPARISON: CT of the chest, abdomen, and pelvis performed 10/13/2017.    FINDINGS:   LOWER CHEST: Mild scattered scarring and/or atelectasis at both lung  bases. A few small indeterminate pulmonary nodules in both lower lungs  are unchanged since 10/13/2017, and are, therefore, highly likely to  be of benign etiology. Coronary artery  calcification and stenting.    HEPATOBILIARY: Prior cholecystectomy. Diffuse fatty infiltration of  the liver. No focal hepatic lesions are seen.    PANCREAS: Mild hazy fat stranding about the pancreatic head is  suspicious for acute pancreatitis. No areas of pancreatic  nonenhancement are identified to suggest pancreatic necrosis. No  peripancreatic fluid collections.    SPLEEN: Normal.    ADRENAL GLANDS: Mild nodular thickening of both adrenal glands is  unchanged.    KIDNEYS/BLADDER: Unremarkable. No hydronephrosis.    BOWEL: No bowel obstruction. No convincing evidence for colitis or  diverticulitis. Unremarkable appendix.    PELVIC ORGANS: Unremarkable.    LYMPH NODES: No enlarged lymph nodes are identified in the abdomen or  pelvis.    VASCULATURE: Mild atherosclerotic aortoiliac calcification.    ADDITIONAL FINDINGS: None.    MUSCULOSKELETAL: Unremarkable.      Impression    IMPRESSION:   1.  Mild hazy fat stranding about the pancreatic head suggests acute  pancreatitis. Clinical and laboratory correlation are recommended.  2.  Diffuse fatty infiltration of the liver.    KUSH BENEDICT MD         SYSTEM ID:  [id number]   CBC with platelets differential     Status: None    Narrative    The following orders were created for panel order CBC with platelets differential.  Procedure                               Abnormality         Status                     ---------                               -----------         ------                     CBC with platelets and d...[[id number]]                      Final result                 Please view results for these tests on the individual orders.   Troponin I     Status: Normal   Result Value Ref Range    Troponin I <0.015 0.000 - 0.045 ug/L   Comprehensive metabolic panel     Status: Abnormal   Result Value Ref Range    Sodium 137 133 - 144 mmol/L    Potassium 4.0 3.4 - 5.3 mmol/L    Chloride 102 94 - 109 mmol/L    Carbon Dioxide (CO2) 31 20 - 32 mmol/L    Anion Gap 4  3 - 14 mmol/L    Urea Nitrogen 16 7 - 30 mg/dL    Creatinine 0.67 0.66 - 1.25 mg/dL    Calcium 8.7 8.5 - 10.1 mg/dL    Glucose 311 (H) 70 - 99 mg/dL    Alkaline Phosphatase 75 40 - 150 U/L    AST 22 0 - 45 U/L    ALT 33 0 - 70 U/L    Protein Total 7.5 6.8 - 8.8 g/dL    Albumin 3.2 (L) 3.4 - 5.0 g/dL    Bilirubin Total 0.4 0.2 - 1.3 mg/dL    GFR Estimate >90 >60 mL/min/1.73m2   Lipase     Status: Normal   Result Value Ref Range    Lipase 183 73 - 393 U/L   CBC with platelets and differential     Status: None   Result Value Ref Range    WBC Count 9.1 4.0 - 11.0 10e3/uL    RBC Count 4.86 4.40 - 5.90 10e6/uL    Hemoglobin 14.9 13.3 - 17.7 g/dL    Hematocrit 44.6 40.0 - 53.0 %    MCV 92 78 - 100 fL    MCH 30.7 26.5 - 33.0 pg    MCHC 33.4 31.5 - 36.5 g/dL    RDW 13.1 10.0 - 15.0 %    Platelet Count 306 150 - 450 10e3/uL    % Neutrophils 60 %    % Lymphocytes 29 %    % Monocytes 8 %    % Eosinophils 2 %    % Basophils 1 %    % Immature Granulocytes 0 %    NRBCs per 100 WBC 0 <1 /100    Absolute Neutrophils 5.5 1.6 - 8.3 10e3/uL    Absolute Lymphocytes 2.7 0.8 - 5.3 10e3/uL    Absolute Monocytes 0.7 0.0 - 1.3 10e3/uL    Absolute Eosinophils 0.2 0.0 - 0.7 10e3/uL    Absolute Basophils 0.1 0.0 - 0.2 10e3/uL    Absolute Immature Granulocytes 0.0 <=0.0 10e3/uL    Absolute NRBCs 0.0 10e3/uL   Creatinine POCT     Status: Abnormal   Result Value Ref Range    Creatinine POCT 0.6 (L) 0.7 - 1.3 mg/dL    GFR, ESTIMATED POCT >60 >60 mL/min/1.73m2   iStat Troponin, POCT     Status: Normal   Result Value Ref Range    TROPPC POCT 0.00 <=0.12 ug/L   Amylase     Status: Normal   Result Value Ref Range    Amylase 50 30 - 110 U/L                       Orders placed or performed during the hospital encounter of 08/28/21     HYDROmorphone (PF) (DILAUDID) injection 0.5 mg     lidocaine (XYLOCAINE) 2 % 15 mL, alum & mag hydroxide-simethicone (MAALOX) 15 mL GI Cocktail     sodium chloride 0.9 % bag 500mL for CT scan flush use     iopamidol  (ISOVUE-370) solution 100 mL     HYDROmorphone (PF) (DILAUDID) injection 0.5 mg     omeprazole (PRILOSEC) 20 MG DR capsule     oxyCODONE-acetaminophen (PERCOCET) 5-325 MG tablet                                     Assessments & Plan (with Medical Decision Making)       I have reviewed the nursing notes.    I have reviewed the findings, diagnosis, plan and need for follow up with the patient.    Discharge Medication List as of 8/28/2021 11:39 PM      START taking these medications    Details   omeprazole (PRILOSEC) 20 MG DR capsule Take 1 capsule (20 mg) by mouth daily, Disp-30 capsule, R-0, Local Print      oxyCODONE-acetaminophen (PERCOCET) 5-325 MG tablet Take 1-2 tablets by mouth every 4 hours as needed for pain, Disp-12 tablet, R-0, Local Print             Patient with pain noted for concerns for possible cardiac versus GI origin cardiac evaluation including EKG with bifascicular block but negative troponins and reproducible epigastric pain improved with GI cocktail I believe that this is related to his abnormal findings on CT of his pancreas and I believe that he may have an underlying pancreatitis needing further evaluation and follow-up patient understands importance of close follow-up with his primary as well as a GI specialist for scoping and will have his pancreas further evaluated.              Final diagnoses:   Acute biliary pancreatitis, unspecified complication status   Gastritis without bleeding, unspecified chronicity, unspecified gastritis type   Epigastric pain       Dominguez Sharp     8/28/2021   [facility] EMERGENCY DEPARTMENT     Dominguez Sharp MD  08/30/21 1119       Doimnguez Sharp MD  08/30/21 1122

## 2021-08-31 LAB
ATRIAL RATE - MUSE: 81 BPM
DIASTOLIC BLOOD PRESSURE - MUSE: NORMAL MMHG
INTERPRETATION ECG - MUSE: NORMAL
P AXIS - MUSE: 65 DEGREES
PR INTERVAL - MUSE: 234 MS
QRS DURATION - MUSE: 136 MS
QT - MUSE: 430 MS
QTC - MUSE: 499 MS
R AXIS - MUSE: -70 DEGREES
SYSTOLIC BLOOD PRESSURE - MUSE: NORMAL MMHG
T AXIS - MUSE: 94 DEGREES
VENTRICULAR RATE- MUSE: 81 BPM

## 2022-10-16 ENCOUNTER — HEALTH MAINTENANCE LETTER (OUTPATIENT)
Age: 57
End: 2022-10-16

## 2022-11-14 ENCOUNTER — CARE COORDINATION (OUTPATIENT)
Dept: ENDOCRINOLOGY | Facility: CLINIC | Age: 57
End: 2022-11-14

## 2022-11-14 NOTE — PROGRESS NOTES
Called patient to remind him of his in person visit tomorrow with Kaylyn Shafer at 10:30 am. He informed me he has the flu and will not be able to attend. I cancelled his appointment with Kaylyn Shafer and Alicja Chung.     Marry Pang, EMT

## 2023-02-13 ENCOUNTER — HOSPITAL ENCOUNTER (EMERGENCY)
Facility: CLINIC | Age: 58
Discharge: HOME | End: 2023-02-13
Attending: EMERGENCY MEDICINE | Admitting: EMERGENCY MEDICINE
Payer: COMMERCIAL

## 2023-02-13 VITALS
OXYGEN SATURATION: 97 % | BODY MASS INDEX: 40.6 KG/M2 | SYSTOLIC BLOOD PRESSURE: 119 MMHG | WEIGHT: 287 LBS | HEART RATE: 78 BPM | RESPIRATION RATE: 16 BRPM | DIASTOLIC BLOOD PRESSURE: 84 MMHG | TEMPERATURE: 98.3 F

## 2023-02-13 DIAGNOSIS — L03.011 ACUTE PARONYCHIA OF FINGER, RIGHT: ICD-10-CM

## 2023-02-13 PROCEDURE — 10060 I&D ABSCESS SIMPLE/SINGLE: CPT | Mod: F6 | Performed by: EMERGENCY MEDICINE

## 2023-02-13 PROCEDURE — 99283 EMERGENCY DEPT VISIT LOW MDM: CPT | Mod: 25 | Performed by: EMERGENCY MEDICINE

## 2023-02-13 PROCEDURE — 99284 EMERGENCY DEPT VISIT MOD MDM: CPT | Mod: 25 | Performed by: EMERGENCY MEDICINE

## 2023-02-13 PROCEDURE — 250N000013 HC RX MED GY IP 250 OP 250 PS 637: Performed by: EMERGENCY MEDICINE

## 2023-02-13 RX ORDER — LIDOCAINE HYDROCHLORIDE 10 MG/ML
5 INJECTION, SOLUTION INFILTRATION; PERINEURAL ONCE
Status: DISCONTINUED | OUTPATIENT
Start: 2023-02-13 | End: 2023-02-13 | Stop reason: HOSPADM

## 2023-02-13 RX ORDER — CEPHALEXIN 500 MG/1
500 CAPSULE ORAL ONCE
Status: COMPLETED | OUTPATIENT
Start: 2023-02-13 | End: 2023-02-13

## 2023-02-13 RX ORDER — LIDOCAINE HYDROCHLORIDE 10 MG/ML
10 INJECTION, SOLUTION INFILTRATION; PERINEURAL
Status: DISCONTINUED | OUTPATIENT
Start: 2023-02-13 | End: 2023-02-13 | Stop reason: HOSPADM

## 2023-02-13 RX ORDER — CEPHALEXIN 500 MG/1
500 CAPSULE ORAL 3 TIMES DAILY
Qty: 21 CAPSULE | Refills: 0 | Status: SHIPPED | OUTPATIENT
Start: 2023-02-13 | End: 2023-02-20

## 2023-02-13 RX ADMIN — CEPHALEXIN 500 MG: 500 CAPSULE ORAL at 12:42

## 2023-02-13 ASSESSMENT — ACTIVITIES OF DAILY LIVING (ADL)
ADLS_ACUITY_SCORE: 35
ADLS_ACUITY_SCORE: 33

## 2023-02-13 NOTE — DISCHARGE INSTRUCTIONS
Take antibiotics as prescribed x 7 days  For pain, please take 975-1000mg acetaminophen (tylenol) every 8 hours -- do not take more than 3000mg in a 24 hour period.    You can also take 600mg ibuprofen (motrin/advil) every 6 hours for pain  Return to ED if pain becomes more extreme, fever>100.4F, or worsening redness/warmth

## 2023-02-13 NOTE — ED PROVIDER NOTES
[facility] [place]    2/13/23      ED PROVIDER NOTE  Hallway I  History     Chief Complaint   Patient presents with     Wound Infection     Right pointer finger. Noticed on Friday it was swollen and a little pus.      The history is provided by the patient and medical records.     Anselmo Gresham is a 57 year old male history of non-insulin-dependent diabetes, obesity, CAD s/p prior STEMI and stenting x2 in 2017 who presents with right index finger swelling and purulent drainage that started 2 days ago. He bites his fingernails and has right hand second digit swelling, redness, and pain around his fingernail without fever.    Last Tdap was in 2019.     This part of the document was transcribed by Florence Reyna, Medical Scribe.      Past Medical History  Past Medical History:   Diagnosis Date     Diabetes mellitus (H)      Dyspepsia and other specified disorders of function of stomach      Myocardial infarction (H)     10/12/2017     Obesity      Unspecified sleep apnea      Past Surgical History:   Procedure Laterality Date     CARDIAC SURGERY      2 stents placed     HC UGI ENDOSCOPY W EUS  4/2/2012    Procedure:COMBINED ENDOSCOPIC ULTRASOUND, ESOPHAGOSCOPY, GASTROSCOPY, DUODENOSCOPY (EGD); Surgeon:ALANA DIAZ; Location: GI     HC UGI ENDOSCOPY W EUS  12/17/2013    Procedure: COMBINED ENDOSCOPIC ULTRASOUND, ESOPHAGOSCOPY, GASTROSCOPY, DUODENOSCOPY (EGD);  Surgeon: Alana Diaz MD;  Location:  GI     HEMORRHOIDECTOMY,EXTERNAL       LAPAROSCOPIC CHOLECYSTECTOMY  4/3/2012    Procedure:LAPAROSCOPIC CHOLECYSTECTOMY; Laparoscopic Cholecystectomy ; Surgeon:LESIA RODRIGUEZ; Location: OR     [facility] ANESTH,REPAIR LO HERNIA VENTR/INCIS       cephALEXin (KEFLEX) 500 MG capsule  aspirin (ASA) 81 MG EC tablet  atorvastatin (LIPITOR) 80 MG tablet  butalbital-acetaminophen-caffeine (FIORICET/ESGIC) -40 MG tablet  carvedilol (COREG) 12.5 MG tablet  carvedilol  (COREG) 12.5 MG tablet  cyclobenzaprine (FLEXERIL) 10 MG tablet  GLIPIZIDE PO  JARDIANCE 10 MG TABS tablet  lisinopril (PRINIVIL/ZESTRIL) 5 MG tablet  losartan (COZAAR) 50 MG tablet  metaxalone (SKELAXIN) 800 MG tablet  METFORMIN HCL PO  methylPREDNISolone (MEDROL DOSEPAK) 4 MG tablet  nicotine (NICODERM CQ) 14 MG/24HR 24 hr patch  oxyCODONE (ROXICODONE) 5 MG immediate release tablet  oxyCODONE-acetaminophen (PERCOCET) 5-325 MG tablet  prochlorperazine (COMPAZINE) 25 MG suppository      No Known Allergies  Family History  Family History   Problem Relation Age of Onset     Diabetes Father      Cancer Father         lung and liver     Social History   Social History     Tobacco Use     Smoking status: Former     Years: 0.00     Types: Cigarettes     Quit date: 10/11/2017     Years since quittin.3     Smokeless tobacco: Never   Substance Use Topics     Alcohol use: Not Currently     Comment: States occ     Drug use: No         A medically appropriate review of systems was performed with pertinent positives and negatives noted in the HPI, and all other systems negative.    Physical Exam   BP: 119/84  Pulse: 78  Temp: 98.3  F (36.8  C)  Resp: 16  Weight: 130.2 kg (287 lb)  SpO2: 97 %  Physical Exam  Musculoskeletal:      Comments: fluctuance and induration around the lateral side of his right second finger nail       ED Course, Procedures, & Data      Procedures             No results found for any visits on 23.  Medications - No data to display  Labs Ordered and Resulted from Time of ED Arrival to Time of ED Departure - No data to display  No orders to display             Medical Decision Making  The patient's presentation is strongly suggestive of an acute complicated injury.    The patient's evaluation involved:  strong consideration of a test (imaging) that was ultimately deferred    The patient's management involved prescription drug management (including medications given in the ED).      Assessment &  Plan      Findings consistent with soft tissue infection and paronychia, I will perform incision and drainage and provide Keflex x7 days prescription with first dose in the emergency Department. Soaked in betadine and saline x 1 hour prior to I&D    INCISION AND DRAINAGE PROCEDURE NOTE   I used 4 cc of 1% lidocaine with digital block. Performed incision and drainage with an #11 blade with mostly sanguinous discharge on the lateral portion of the nail. Site is hemostatic upon discharge. Band-Aid placed.  Neurovascularly intact upon discharge    This part of the document was transcribed by Florence Reyna, Medical Scribe.      I have reviewed the nursing notes. I have reviewed the findings, diagnosis, plan and need for follow up with the patient.    New Prescriptions    No medications on file       Final diagnoses:   None     ECTOR HIGUERA MD   [facility] EMERGENCY DEPARTMENT  2/13/2023     Ector Higuera MD  02/13/23 2531

## 2023-02-13 NOTE — ED TRIAGE NOTES
Pt is a type 2 diabetic. Pt noticed right, pointer finger on side of the nail getting swollen and red on Friday. The finger now is very painful and pt is concerned it is infected.     Triage Assessment     Row Name 02/13/23 1047       Triage Assessment (Adult)    Airway WDL WDL       Respiratory WDL    Respiratory WDL WDL       Skin Circulation/Temperature WDL    Skin Circulation/Temperature WDL WDL       Cardiac WDL    Cardiac WDL WDL       Peripheral/Neurovascular WDL    Peripheral Neurovascular WDL WDL       Cognitive/Neuro/Behavioral WDL    Cognitive/Neuro/Behavioral WDL WDL

## 2023-06-01 ENCOUNTER — HEALTH MAINTENANCE LETTER (OUTPATIENT)
Age: 58
End: 2023-06-01

## 2023-11-04 ENCOUNTER — HEALTH MAINTENANCE LETTER (OUTPATIENT)
Age: 58
End: 2023-11-04

## 2024-01-13 ENCOUNTER — HEALTH MAINTENANCE LETTER (OUTPATIENT)
Age: 59
End: 2024-01-13

## 2024-08-10 ENCOUNTER — HEALTH MAINTENANCE LETTER (OUTPATIENT)
Age: 59
End: 2024-08-10

## 2024-12-22 ENCOUNTER — HEALTH MAINTENANCE LETTER (OUTPATIENT)
Age: 59
End: 2024-12-22

## 2025-02-22 ENCOUNTER — HEALTH MAINTENANCE LETTER (OUTPATIENT)
Age: 60
End: 2025-02-22

## (undated) RX ORDER — FENTANYL CITRATE 50 UG/ML
INJECTION, SOLUTION INTRAMUSCULAR; INTRAVENOUS
Status: DISPENSED
Start: 2017-10-13

## (undated) RX ORDER — HEPARIN SODIUM 1000 [USP'U]/ML
INJECTION, SOLUTION INTRAVENOUS; SUBCUTANEOUS
Status: DISPENSED
Start: 2017-10-13

## (undated) RX ORDER — NITROGLYCERIN 5 MG/ML
VIAL (ML) INTRAVENOUS
Status: DISPENSED
Start: 2017-10-13